# Patient Record
Sex: MALE | Race: WHITE | Employment: OTHER | URBAN - NONMETROPOLITAN AREA
[De-identification: names, ages, dates, MRNs, and addresses within clinical notes are randomized per-mention and may not be internally consistent; named-entity substitution may affect disease eponyms.]

---

## 2017-03-15 ENCOUNTER — OFFICE VISIT (OUTPATIENT)
Dept: ORTHOPEDIC SURGERY | Age: 62
End: 2017-03-15
Payer: COMMERCIAL

## 2017-03-15 VITALS
DIASTOLIC BLOOD PRESSURE: 64 MMHG | HEIGHT: 73 IN | BODY MASS INDEX: 22.7 KG/M2 | WEIGHT: 171.3 LBS | SYSTOLIC BLOOD PRESSURE: 120 MMHG | HEART RATE: 78 BPM

## 2017-03-15 DIAGNOSIS — M25.561 ACUTE PAIN OF BOTH KNEES: Primary | ICD-10-CM

## 2017-03-15 DIAGNOSIS — M25.562 ACUTE PAIN OF BOTH KNEES: Primary | ICD-10-CM

## 2017-03-15 PROCEDURE — 99203 OFFICE O/P NEW LOW 30 MIN: CPT | Performed by: FAMILY MEDICINE

## 2017-06-10 ENCOUNTER — OFFICE VISIT (OUTPATIENT)
Dept: PRIMARY CARE CLINIC | Age: 62
End: 2017-06-10
Payer: COMMERCIAL

## 2017-06-10 VITALS
OXYGEN SATURATION: 96 % | SYSTOLIC BLOOD PRESSURE: 100 MMHG | HEIGHT: 73 IN | TEMPERATURE: 97.2 F | DIASTOLIC BLOOD PRESSURE: 60 MMHG | BODY MASS INDEX: 22.93 KG/M2 | RESPIRATION RATE: 16 BRPM | WEIGHT: 173 LBS | HEART RATE: 60 BPM

## 2017-06-10 DIAGNOSIS — K04.7 CHRONIC DENTAL INFECTION: Primary | ICD-10-CM

## 2017-06-10 PROCEDURE — 99213 OFFICE O/P EST LOW 20 MIN: CPT | Performed by: FAMILY MEDICINE

## 2017-06-10 RX ORDER — CLINDAMYCIN HYDROCHLORIDE 300 MG/1
300 CAPSULE ORAL 3 TIMES DAILY
Qty: 30 CAPSULE | Refills: 0 | Status: SHIPPED | OUTPATIENT
Start: 2017-06-10 | End: 2017-06-20

## 2017-06-10 RX ORDER — MELOXICAM 15 MG/1
15 TABLET ORAL DAILY
COMMUNITY
Start: 2017-06-05 | End: 2017-11-01

## 2017-06-10 ASSESSMENT — ENCOUNTER SYMPTOMS
RESPIRATORY NEGATIVE: 1
GASTROINTESTINAL NEGATIVE: 1
ALLERGIC/IMMUNOLOGIC NEGATIVE: 1
EYES NEGATIVE: 1

## 2017-08-23 ENCOUNTER — OFFICE VISIT (OUTPATIENT)
Dept: UROLOGY | Age: 62
End: 2017-08-23
Payer: COMMERCIAL

## 2017-08-23 VITALS
BODY MASS INDEX: 22.4 KG/M2 | WEIGHT: 169 LBS | HEART RATE: 72 BPM | SYSTOLIC BLOOD PRESSURE: 102 MMHG | HEIGHT: 73 IN | DIASTOLIC BLOOD PRESSURE: 64 MMHG

## 2017-08-23 DIAGNOSIS — R35.0 FREQUENCY OF MICTURITION: Primary | ICD-10-CM

## 2017-08-23 DIAGNOSIS — N41.9 PROSTATITIS, UNSPECIFIED PROSTATITIS TYPE: ICD-10-CM

## 2017-08-23 LAB
BILIRUBIN, POC: NORMAL
BLOOD URINE, POC: NORMAL
CLARITY, POC: NORMAL
COLOR, POC: YELLOW
GLUCOSE URINE, POC: NORMAL
KETONES, POC: NORMAL
LEUKOCYTE EST, POC: NORMAL
NITRITE, POC: NORMAL
PH, POC: 6.5
PROTEIN, POC: NORMAL
SPECIFIC GRAVITY, POC: 1.01
UROBILINOGEN, POC: 0.2

## 2017-08-23 PROCEDURE — 99213 OFFICE O/P EST LOW 20 MIN: CPT | Performed by: UROLOGY

## 2017-08-23 PROCEDURE — 81003 URINALYSIS AUTO W/O SCOPE: CPT | Performed by: UROLOGY

## 2017-08-23 RX ORDER — OMEPRAZOLE 20 MG/1
20 CAPSULE, DELAYED RELEASE ORAL DAILY
Refills: 0 | COMMUNITY
Start: 2017-08-08 | End: 2017-12-20

## 2017-08-23 RX ORDER — CIPROFLOXACIN 500 MG/1
500 TABLET, FILM COATED ORAL 2 TIMES DAILY
Qty: 20 TABLET | Refills: 0 | Status: SHIPPED | OUTPATIENT
Start: 2017-08-23 | End: 2017-09-02

## 2017-10-27 DIAGNOSIS — M25.512 LEFT SHOULDER PAIN, UNSPECIFIED CHRONICITY: Primary | ICD-10-CM

## 2017-10-30 ENCOUNTER — HOSPITAL ENCOUNTER (OUTPATIENT)
Dept: GENERAL RADIOLOGY | Age: 62
Discharge: HOME | End: 2017-10-30
Payer: COMMERCIAL

## 2017-10-30 DIAGNOSIS — M25.512 LEFT SHOULDER PAIN, UNSPECIFIED CHRONICITY: ICD-10-CM

## 2017-10-30 PROCEDURE — 73030 X-RAY EXAM OF SHOULDER: CPT

## 2017-11-01 ENCOUNTER — OFFICE VISIT (OUTPATIENT)
Dept: ORTHOPEDIC SURGERY | Age: 62
End: 2017-11-01
Payer: COMMERCIAL

## 2017-11-01 VITALS
BODY MASS INDEX: 23.3 KG/M2 | DIASTOLIC BLOOD PRESSURE: 64 MMHG | HEIGHT: 72 IN | HEART RATE: 65 BPM | WEIGHT: 172 LBS | SYSTOLIC BLOOD PRESSURE: 110 MMHG

## 2017-11-01 DIAGNOSIS — M75.102 ROTATOR CUFF SYNDROME, LEFT: Primary | ICD-10-CM

## 2017-11-01 PROCEDURE — 99213 OFFICE O/P EST LOW 20 MIN: CPT | Performed by: FAMILY MEDICINE

## 2017-11-01 RX ORDER — MELOXICAM 15 MG/1
15 TABLET ORAL DAILY
Qty: 30 TABLET | Refills: 1 | Status: SHIPPED | OUTPATIENT
Start: 2017-11-01 | End: 2018-02-07

## 2017-11-01 NOTE — LETTER
[facility] ORTHOPEDICS  [facility]  Phone: [phone]  Fax: [phone]    Milena Lynn DO        November 1, 2017     Patient: Nataliia Smith   YOB: 1955   Date of Visit: 11/1/2017       To Whom It May Concern: It is my medical opinion that Susana Gambino may return to work on 1/1/2017. If you have any questions or concerns, please don't hesitate to call.     Sincerely,        Milena Lynn DO

## 2017-11-01 NOTE — LETTER
[facility] ORTHOPEDICS  [facility]  Phone: [phone]  Fax: [phone]    Nasrin Lawrence DO        November 1, 2017     Patient: Lissy Hay   YOB: 1955   Date of Visit: 11/1/2017       To Whom It May Concern: It is my medical opinion that Temi Chester may return to work on 11/01/2017. If you have any questions or concerns, please don't hesitate to call.     Sincerely,        Nasrin Lawrence DO

## 2017-11-01 NOTE — PROGRESS NOTES
Sports Medicine Consultation    CHIEF COMPLAINT:  Shoulder Pain (left shoulder pain)        HPI:   The patient is a 63 y.o. male who is being seen as a  established patient being seen for regarding new problem left shoulder. The patient is a right hand dominant male who has had shoulder pain for 1m. As far as trauma to the shoulder, the patient indicates no trauma. The pain is not worse at night and when doing overhead activities. Weakness of the shoulder has  been noted. The pain restricts activities such as does keep him from doing things at work but hasnt seen a big difference. The pain does not seem to improve with time. The following medications have been tried: was going to therapy with benefit. Physical Therapy has been tried. Corticosteroid injection has not been done. Neck pain has not been present. he has a past medical history of Depression and Reflux esophagitis. he has a past surgical history that includes Cystocopy; lymph node dissection (Left); Colonoscopy; and sinus surgery (Bilateral, 1994). Past Medical History:   Diagnosis Date    Depression     Reflux esophagitis        Past Surgical History:   Procedure Laterality Date    COLONOSCOPY      CYSTOSCOPY      WITH BLADDER BIOPSY    LYMPH NODE DISSECTION Left     AXILLARY    SINUS SURGERY Bilateral 1994       family history includes Diabetes in his father. Social History     Social History    Marital status:      Spouse name: N/A    Number of children: N/A    Years of education: N/A     Occupational History    Not on file.      Social History Main Topics    Smoking status: Never Smoker    Smokeless tobacco: Never Used    Alcohol use No    Drug use: No    Sexual activity: Not on file     Other Topics Concern    Not on file     Social History Narrative    No narrative on file       Current Outpatient Prescriptions   Medication Sig Dispense Refill    omeprazole (PRILOSEC) 20 MG delayed release Belly press test negative. Pain over AC joint negative. Pain over traps/rhomboids negative. PSYCH:  Patient has good fund of knowledge and displays understanding of exam.    RADIOLOGY: No results found. 3 views of the Left shoulder were ordered,  independently visualized by me, and discussed with patient. Findings: left shoulder radiographs are unremarkable     IMPRESSION:     1. Rotator cuff syndrome, left        PLAN:   We discussed some of the etiologies and natural histories of     ICD-10-CM ICD-9-CM    1. Rotator cuff syndrome, left M75.102 726.10 Amb External Referral To Physical Therapy     We discussed the various treatment alternatives including anti-inflammatory medications, physical therapy, injections, further imaging studies and as a last resort surgery. I do think the patient has some very mild rotator cuff syndrome and snapping that he is getting is coming from the undersurface of the acromion rubbing on supraspinous tendon. We'll have him continue with formal physical therapy prescription for meloxicam was provided as far up with me is otherwise as needed    Return to clinic Return if symptoms worsen or fail to improve. .    Electronically signed by Marichuy Alvarado DO, FAOASM on 11/1/17 at 9:32 AM

## 2017-12-20 ENCOUNTER — OFFICE VISIT (OUTPATIENT)
Dept: ORTHOPEDIC SURGERY | Age: 62
End: 2017-12-20
Payer: COMMERCIAL

## 2017-12-20 VITALS
BODY MASS INDEX: 23.3 KG/M2 | DIASTOLIC BLOOD PRESSURE: 73 MMHG | HEIGHT: 72 IN | WEIGHT: 172 LBS | HEART RATE: 63 BPM | SYSTOLIC BLOOD PRESSURE: 126 MMHG

## 2017-12-20 DIAGNOSIS — M75.102 ROTATOR CUFF SYNDROME, LEFT: Primary | ICD-10-CM

## 2017-12-20 PROCEDURE — 99213 OFFICE O/P EST LOW 20 MIN: CPT | Performed by: FAMILY MEDICINE

## 2018-01-03 ENCOUNTER — OFFICE VISIT (OUTPATIENT)
Dept: ORTHOPEDIC SURGERY | Age: 63
End: 2018-01-03
Payer: COMMERCIAL

## 2018-01-03 VITALS
HEART RATE: 76 BPM | SYSTOLIC BLOOD PRESSURE: 124 MMHG | WEIGHT: 172 LBS | BODY MASS INDEX: 23.3 KG/M2 | HEIGHT: 72 IN | DIASTOLIC BLOOD PRESSURE: 76 MMHG

## 2018-01-03 DIAGNOSIS — M75.102 ROTATOR CUFF SYNDROME, LEFT: Primary | ICD-10-CM

## 2018-01-03 PROCEDURE — 20610 DRAIN/INJ JOINT/BURSA W/O US: CPT | Performed by: FAMILY MEDICINE

## 2018-01-03 PROCEDURE — 99213 OFFICE O/P EST LOW 20 MIN: CPT | Performed by: FAMILY MEDICINE

## 2018-01-03 RX ORDER — TRIAMCINOLONE ACETONIDE 40 MG/ML
40 INJECTION, SUSPENSION INTRA-ARTICULAR; INTRAMUSCULAR ONCE
Status: CANCELLED | OUTPATIENT
Start: 2018-01-03 | End: 2018-01-03

## 2018-01-03 RX ORDER — BUPIVACAINE HYDROCHLORIDE 5 MG/ML
4 INJECTION, SOLUTION PERINEURAL ONCE
Status: COMPLETED | OUTPATIENT
Start: 2018-01-03 | End: 2018-01-03

## 2018-01-03 RX ORDER — BETAMETHASONE SODIUM PHOSPHATE AND BETAMETHASONE ACETATE 3; 3 MG/ML; MG/ML
6 INJECTION, SUSPENSION INTRA-ARTICULAR; INTRALESIONAL; INTRAMUSCULAR; SOFT TISSUE ONCE
Status: COMPLETED | OUTPATIENT
Start: 2018-01-03 | End: 2018-01-03

## 2018-01-03 RX ADMIN — BETAMETHASONE SODIUM PHOSPHATE AND BETAMETHASONE ACETATE 6 MG: 3; 3 INJECTION, SUSPENSION INTRA-ARTICULAR; INTRALESIONAL; INTRAMUSCULAR; SOFT TISSUE at 14:15

## 2018-01-03 RX ADMIN — BUPIVACAINE HYDROCHLORIDE 20 MG: 5 INJECTION, SOLUTION PERINEURAL at 14:14

## 2018-01-03 NOTE — PROGRESS NOTES
over [facility] joint negative. Pain over traps/rhomboids negative. PSYCH:  Patient has good fund of knowledge and displays understanding of exam.    RADIOLOGY: No results found. 3 views of the Left shoulder were ordered,  independently visualized by me, and discussed with patient. Findings: Previous left shoulders are unremarkable         IMPRESSION:     1. Rotator cuff syndrome, left        PLAN:   We discussed some of the etiologies and natural histories of     ICD-10-CM ICD-9-CM    1. Rotator cuff syndrome, left M75.102 726.10      We discussed the various treatment alternatives including anti-inflammatory medications, physical therapy, injections, further imaging studies and as a last resort surgery. This point patient would like to try for a little bit extra pain relief in that shoulder and I do think a cortisone is relatively reasonable one was administered into his left shoulder in the manners typed and chart patient time procedure well. F/u prn    Return to clinic No Follow-up on file. .    Electronically signed by Dontrell Rivera DO, FAOASM on 1/3/18 at 1:36 PM    After the risks, benefits, alternatives, and procedure was discussed with the patient. A timeout was performed. The patients Left shoulder was prepped in a sterile manner with alcohol. The skin was cooled with cold spray and then cleaned with an alcohol prep. I injected 1 mL 6 mg betamethasone and 4 ml of 0.5% Marcaine in a posterior subacromial bursa approach. Patient tolerated the procedure well. Post injection instructions provided.

## 2018-01-04 DIAGNOSIS — M25.511 RIGHT SHOULDER PAIN, UNSPECIFIED CHRONICITY: Primary | ICD-10-CM

## 2018-01-09 ENCOUNTER — HOSPITAL ENCOUNTER (OUTPATIENT)
Dept: GENERAL RADIOLOGY | Age: 63
Discharge: HOME | End: 2018-01-09
Payer: COMMERCIAL

## 2018-01-09 DIAGNOSIS — M25.511 RIGHT SHOULDER PAIN, UNSPECIFIED CHRONICITY: ICD-10-CM

## 2018-01-09 PROCEDURE — 73030 X-RAY EXAM OF SHOULDER: CPT

## 2018-01-10 ENCOUNTER — OFFICE VISIT (OUTPATIENT)
Dept: ORTHOPEDIC SURGERY | Age: 63
End: 2018-01-10
Payer: COMMERCIAL

## 2018-01-10 VITALS
BODY MASS INDEX: 23.3 KG/M2 | DIASTOLIC BLOOD PRESSURE: 68 MMHG | WEIGHT: 172 LBS | HEART RATE: 79 BPM | SYSTOLIC BLOOD PRESSURE: 102 MMHG | HEIGHT: 72 IN

## 2018-01-10 DIAGNOSIS — M75.41 IMPINGEMENT SYNDROME OF RIGHT SHOULDER: Primary | ICD-10-CM

## 2018-01-10 PROCEDURE — 99213 OFFICE O/P EST LOW 20 MIN: CPT | Performed by: FAMILY MEDICINE

## 2018-01-10 RX ORDER — BUPROPION HYDROCHLORIDE 150 MG/1
TABLET ORAL
Refills: 0 | COMMUNITY
Start: 2018-01-05 | End: 2022-02-08 | Stop reason: ALTCHOICE

## 2018-01-10 NOTE — PROGRESS NOTES
meloxicam (MOBIC) 15 MG tablet Take 1 tablet by mouth daily 30 tablet 1     No current facility-administered medications for this visit. Allergies:  heis allergic to pcn [penicillins]; sulfa antibiotics; triamcinolone; povidone iodine; and triamcinolone hexacetonide. ROS:  CV:  Denies chest pain; palpitations; shortness of breath; swelling of feet, ankles; and loss of consciousness. CON: Denies fever and dizziness. ENT:  Denies hearing loss / ringing, ear infections hoarseness, and swallowing problems. RESP:  Denies chronic cough, spitting up blood, and asthma/wheezing. GI: Denies abdominal pain, change in bowel habits, nausea or vomiting, and blood in stools. :  Denies frequent urination, burning or painful urination, blood in the urine, and bladder incontinence. NEURO:  Denies headache, memory loss, sleep disturbance, and tremor or movement disorder. PHYSICAL EXAM:   /68   Pulse 79   Ht 6' (1.829 m)   Wt 172 lb (78 kg)   BMI 23.33 kg/m²   GENERAL: Lynnea Bernheim is a 63 y.o. male who is alert and oriented and sitting comfortably in our office. SKIN:  Intact without rashes, lesions or ulcerations. No obvious deformity or swelling. NEURO: Musculoskeletal and axillary nerves intact to sensory and motor testing. EYES:  Extraocular muscles intact. MOUTH: Oral mucosa moist.  No perioral lesions. PULM:  Respirations unlabored and regular. VASC:  Capillary refill less than 3 seconds. Cervical spine ROM WNL  Spurlings: negative,     MSK:  Forward elevation 170 degrees, external rotation in neutral 90 degrees, abduction 180 degrees, internal rotation to L1. Supraspinatus 5/5   External rotators 5/5  Internal 5/5  Full Can negative   Empty Can negative   Neer's test negative   Horne-Ezn test. negative. Pain with cross body adduction negative. Anterior Labral Stress test negative. Speed's test negative   Harding's test negative.    Pain over anterolateral acromion

## 2018-02-07 ENCOUNTER — OFFICE VISIT (OUTPATIENT)
Dept: ORTHOPEDIC SURGERY | Age: 63
End: 2018-02-07
Payer: COMMERCIAL

## 2018-02-07 VITALS
BODY MASS INDEX: 23.3 KG/M2 | SYSTOLIC BLOOD PRESSURE: 109 MMHG | DIASTOLIC BLOOD PRESSURE: 67 MMHG | HEIGHT: 72 IN | HEART RATE: 71 BPM | WEIGHT: 172 LBS

## 2018-02-07 DIAGNOSIS — M75.41 IMPINGEMENT SYNDROME OF RIGHT SHOULDER: Primary | ICD-10-CM

## 2018-02-07 PROCEDURE — 99213 OFFICE O/P EST LOW 20 MIN: CPT | Performed by: FAMILY MEDICINE

## 2018-02-07 NOTE — PROGRESS NOTES
Sports Medicine Consultation    CHIEF COMPLAINT:  Shoulder Pain (rech right shoulder pain)        HPI:   The patient is a 63 y.o. male who is being seen as a  established patient being seen for regarding recurrence of a previously resolved problem r shoulder pain. The patient is a right hand dominant male who has had shoulder pain for days. As far as trauma to the shoulder, the patient indicates doing planks had some pain in r shoulder. The pain is not worse at night and when doing overhead activities. Weakness of the shoulder has not  been noted. The pain restricts activities such as none. The pain does not seem to improve with time. The following medications have been tried: PT with benefit. Physical Therapy has been tried. Corticosteroid injection has not been done. Neck pain has not been present. he has a past medical history of Depression and Reflux esophagitis. he has a past surgical history that includes Cystocopy; lymph node dissection (Left); Colonoscopy; and sinus surgery (Bilateral, 1994). Past Medical History:   Diagnosis Date    Depression     Reflux esophagitis        Past Surgical History:   Procedure Laterality Date    COLONOSCOPY      CYSTOSCOPY      WITH BLADDER BIOPSY    LYMPH NODE DISSECTION Left     AXILLARY    SINUS SURGERY Bilateral 1994       family history includes Diabetes in his father. Social History     Social History    Marital status:      Spouse name: N/A    Number of children: N/A    Years of education: N/A     Occupational History    Not on file.      Social History Main Topics    Smoking status: Never Smoker    Smokeless tobacco: Never Used    Alcohol use No    Drug use: No    Sexual activity: Not on file     Other Topics Concern    Not on file     Social History Narrative    No narrative on file       Current Outpatient Prescriptions   Medication Sig Dispense Refill    buPROPion (WELLBUTRIN XL) 150 MG extended release tablet 0     No current facility-administered medications for this visit. Allergies:  heis allergic to pcn [penicillins]; sulfa antibiotics; triamcinolone; povidone iodine; and triamcinolone hexacetonide. ROS:  CV:  Denies chest pain; palpitations; shortness of breath; swelling of feet, ankles; and loss of consciousness. CON: Denies fever and dizziness. ENT:  Denies hearing loss / ringing, ear infections hoarseness, and swallowing problems. RESP:  Denies chronic cough, spitting up blood, and asthma/wheezing. GI: Denies abdominal pain, change in bowel habits, nausea or vomiting, and blood in stools. :  Denies frequent urination, burning or painful urination, blood in the urine, and bladder incontinence. NEURO:  Denies headache, memory loss, sleep disturbance, and tremor or movement disorder. PHYSICAL EXAM:   /67   Pulse 71   Ht 6' (1.829 m)   Wt 172 lb (78 kg)   BMI 23.33 kg/m²   GENERAL: Halle Bradley is a 63 y.o. male who is alert and oriented and sitting comfortably in our office. SKIN:  Intact without rashes, lesions or ulcerations. No obvious deformity or swelling. NEURO: Musculoskeletal and axillary nerves intact to sensory and motor testing. EYES:  Extraocular muscles intact. MOUTH: Oral mucosa moist.  No perioral lesions. PULM:  Respirations unlabored and regular. VASC:  Capillary refill less than 3 seconds. Cervical spine ROM WNL  Spurlings: negative,     MSK:  Forward elevation 180degrees, external rotation in neutral 90 degrees, abduction 180 degrees, internal rotation to T8. Supraspinatus 5/5   External rotators 5/5  Internal 5/5  Full Can negative   Empty Can negative   Neer's test negative   Horne-Zen test. negative. Pain with cross body adduction negative. Anterior Labral Stress test negative. Speed's test negative   [place]'s test negative. Pain over anterolateral acromion negative. Subscap liftoff negative. Belly press test negative.

## 2018-03-17 ENCOUNTER — OFFICE VISIT (OUTPATIENT)
Dept: PRIMARY CARE CLINIC | Age: 63
End: 2018-03-17

## 2018-03-17 VITALS
DIASTOLIC BLOOD PRESSURE: 70 MMHG | TEMPERATURE: 97.5 F | OXYGEN SATURATION: 98 % | SYSTOLIC BLOOD PRESSURE: 104 MMHG | RESPIRATION RATE: 16 BRPM | BODY MASS INDEX: 23.49 KG/M2 | HEART RATE: 88 BPM | WEIGHT: 177.2 LBS | HEIGHT: 73 IN

## 2018-03-17 DIAGNOSIS — R10.31 RIGHT INGUINAL PAIN: Primary | ICD-10-CM

## 2018-03-17 PROCEDURE — 99213 OFFICE O/P EST LOW 20 MIN: CPT | Performed by: PHYSICIAN ASSISTANT

## 2018-03-17 ASSESSMENT — ENCOUNTER SYMPTOMS
VOMITING: 0
NAUSEA: 0
DIARRHEA: 0

## 2018-03-17 NOTE — PROGRESS NOTES
Subjective:      Patient ID: Leoncio Rodriguez is a 63 y.o. male. Patient is seen due to pain in right groin for several years. Yesterday he was lifting containers and is now worried thought to come in and get checked out. Occasional pain it is rare. No problems with BM. No bulges noted at times has felt wiggling and will push on it and no problems. Review of Systems   Constitutional: Negative for appetite change and fatigue. HENT: Negative. Respiratory: Negative. Cardiovascular: Negative. Gastrointestinal: Negative for blood in stool, constipation, diarrhea, nausea and vomiting. Genitourinary: Negative. Negative for difficulty urinating, hematuria, scrotal swelling and testicular pain. Musculoskeletal: Negative. Skin: Negative for color change and rash. Neurological: Negative for numbness. Psychiatric/Behavioral: Negative for sleep disturbance. Past Medical History:   Diagnosis Date    Depression     Reflux esophagitis      Past Surgical History:   Procedure Laterality Date    COLONOSCOPY      CYSTOSCOPY      WITH BLADDER BIOPSY    LYMPH NODE DISSECTION Left     AXILLARY    SINUS SURGERY Bilateral 1994     Social History     Social History    Marital status:      Spouse name: N/A    Number of children: N/A    Years of education: N/A     Occupational History    Not on file. Social History Main Topics    Smoking status: Never Smoker    Smokeless tobacco: Never Used    Alcohol use No    Drug use: No    Sexual activity: Not on file     Other Topics Concern    Not on file     Social History Narrative    No narrative on file     Family History   Problem Relation Age of Onset    Diabetes Father        Allergies   Allergen Reactions    Pcn [Penicillins]      Skin tested positive as child    Sulfa Antibiotics Nausea Only    Triamcinolone     Povidone Iodine Rash    Triamcinolone Hexacetonide Rash     Topical Kenalog only.  Patient reports having

## 2018-03-18 ASSESSMENT — ENCOUNTER SYMPTOMS
CONSTIPATION: 0
BLOOD IN STOOL: 0
COLOR CHANGE: 0
RESPIRATORY NEGATIVE: 1

## 2018-03-19 ENCOUNTER — TELEPHONE (OUTPATIENT)
Dept: PRIMARY CARE CLINIC | Age: 63
End: 2018-03-19

## 2018-03-19 NOTE — TELEPHONE ENCOUNTER
Spoke to patient regarding ultrasound of scrotum and testicles. Patient states he is in between insurance. Transferred patient to patient services to discuss billing. She gave him the hospital billing number to call and he will call back if he would like to schedule.

## 2018-03-22 DIAGNOSIS — N50.3 EPIDIDYMAL CYST: ICD-10-CM

## 2018-03-22 DIAGNOSIS — N43.3 HYDROCELE IN ADULT: Primary | ICD-10-CM

## 2018-04-18 ENCOUNTER — OFFICE VISIT (OUTPATIENT)
Dept: ORTHOPEDIC SURGERY | Age: 63
End: 2018-04-18
Payer: COMMERCIAL

## 2018-04-18 VITALS
WEIGHT: 177 LBS | BODY MASS INDEX: 23.46 KG/M2 | HEIGHT: 73 IN | HEART RATE: 82 BPM | DIASTOLIC BLOOD PRESSURE: 78 MMHG | SYSTOLIC BLOOD PRESSURE: 103 MMHG

## 2018-04-18 DIAGNOSIS — M75.102 BILATERAL ROTATOR CUFF SYNDROME: Primary | ICD-10-CM

## 2018-04-18 DIAGNOSIS — M75.101 BILATERAL ROTATOR CUFF SYNDROME: Primary | ICD-10-CM

## 2018-04-18 PROCEDURE — 99213 OFFICE O/P EST LOW 20 MIN: CPT | Performed by: FAMILY MEDICINE

## 2018-06-21 DIAGNOSIS — M25.561 CHRONIC PAIN OF RIGHT KNEE: Primary | ICD-10-CM

## 2018-06-21 DIAGNOSIS — G89.29 CHRONIC PAIN OF RIGHT KNEE: Primary | ICD-10-CM

## 2021-10-12 ENCOUNTER — OFFICE VISIT (OUTPATIENT)
Dept: ORTHOPEDIC SURGERY | Age: 66
End: 2021-10-12
Payer: COMMERCIAL

## 2021-10-12 VITALS
WEIGHT: 177 LBS | DIASTOLIC BLOOD PRESSURE: 70 MMHG | BODY MASS INDEX: 23.46 KG/M2 | HEIGHT: 73 IN | HEART RATE: 67 BPM | SYSTOLIC BLOOD PRESSURE: 126 MMHG

## 2021-10-12 DIAGNOSIS — M72.2 PLANTAR FASCIAL FIBROMATOSIS: Primary | ICD-10-CM

## 2021-10-12 PROCEDURE — 99213 OFFICE O/P EST LOW 20 MIN: CPT | Performed by: PODIATRIST

## 2021-10-18 NOTE — PROGRESS NOTES
[facility]         Consult and Procedure Note      [id number]  AGE: 61 y.o. GENDER: male  : 1955  EPISODE DATE:  10/12/2021    Subjective:     Chief Complaint   Patient presents with    Foot Problem     rech lópez feet/ orthotics         HISTORY of PRESENT ILLNESS HPI     Gale Henry is a 61 y.o. male presenting to clinic today for initial evaluation. Patient has previously been established with me through our [facility] office. 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 Patient was previously casted for custom orthotics which she was fit dispensed for in the office today. Patient was educated on the 2-3 week break in. Patient was encouraged to call the office should he have any questions. Patient to continue with at home doctor at the stretching and avoid barefoot ambulation. All further questions concerns regarding medical management were otherwise addressed. PAST MEDICAL HISTORY        Diagnosis Date    Depression     Reflux esophagitis        PAST SURGICAL HISTORY    Past Surgical History:   Procedure Laterality Date    COLONOSCOPY      CYSTOSCOPY      WITH BLADDER BIOPSY    LYMPH NODE DISSECTION Left     AXILLARY    SINUS SURGERY Bilateral        FAMILY HISTORY    Family History   Problem Relation Age of Onset    Diabetes Father        SOCIAL HISTORY    Social History     Tobacco Use    Smoking status: Never Smoker    Smokeless tobacco: Never Used   Substance Use Topics    Alcohol use: No    Drug use: No       ALLERGIES    Allergies   Allergen Reactions    Pcn [Penicillins]      Skin tested positive as child    Sulfa Antibiotics Nausea Only    Triamcinolone     Povidone Iodine Rash    Triamcinolone Hexacetonide Rash     Topical Kenalog only.  Patient reports having multiple celestone injections without rash MEDICATIONS    Current Outpatient Medications on File Prior to Visit   Medication Sig Dispense Refill    buPROPion (WELLBUTRIN XL) 150 MG extended release tablet   0     No current facility-administered medications on file prior to visit. Review of Systems  General: (-) weight change, fatigue, weakness, fever, chills, night sweats  Head: (-) trauma, heacache location, frequency, nausea, vomiting, visual changes  Eyes: (-) glasses, contact lenses, blurriness, tearing, itching, acute visual changes  Ears: (-) hearing loss, tinnitus, vertigo, discharge, earache  Skin: (-) rash, subcutaneous lesion, open ulceration      Objective:      /70   Pulse 67   Ht 6' 1\" (1.854 m)   Wt 177 lb (80.3 kg)   BMI 23.35 kg/m²     Wt Readings from Last 3 Encounters:   10/12/21 177 lb (80.3 kg)   04/18/18 177 lb (80.3 kg)   03/17/18 177 lb 3.2 oz (80.4 kg)       PHYSICAL EXAMINATION  CONSTITUTIONAL:  awake, alert, cooperative, no apparent distress, and appears stated age  Vascular: Pedal pulses are palpable skin temperature within normal means capillary fill time intact to lesser toes. Dermatologic: Skin envelope appears well maintained, hydrated  Neurovascular: Epicritic sensations grossly intact  Musculoskeletal: Patient is noted to have 5 of 5 muscle strength to all extrinsic muscle groups tested. Good range of motion within the ankle joint with no crepitus appreciated.   Semireducible cavovarus deformity hindfoot driven bilateral.    IMAGING:  Not applicable         LABS       CBC:   Lab Results   Component Value Date    WBC 9.7 07/05/2016    HGB 14.1 07/05/2016    HCT 41.8 07/05/2016    MCV 88.2 07/05/2016     07/05/2016     BMP:   Lab Results   Component Value Date     07/05/2016    K 3.5 07/05/2016    CL 99 07/05/2016    CO2 24 07/05/2016    BUN 19 07/05/2016    CREATININE 0.83 07/05/2016     PT/INR: No results found for: PROTIME, INR  Prealbumin: No results found for: PREALBUMIN  Albumin:No

## 2022-02-08 ENCOUNTER — OFFICE VISIT (OUTPATIENT)
Dept: ORTHOPEDIC SURGERY | Age: 67
End: 2022-02-08
Payer: COMMERCIAL

## 2022-02-08 VITALS
DIASTOLIC BLOOD PRESSURE: 68 MMHG | BODY MASS INDEX: 23.46 KG/M2 | HEART RATE: 82 BPM | WEIGHT: 177 LBS | SYSTOLIC BLOOD PRESSURE: 130 MMHG | HEIGHT: 73 IN | OXYGEN SATURATION: 98 %

## 2022-02-08 DIAGNOSIS — M72.2 PLANTAR FASCIAL FIBROMATOSIS: Primary | ICD-10-CM

## 2022-02-08 PROCEDURE — 99213 OFFICE O/P EST LOW 20 MIN: CPT | Performed by: PODIATRIST

## 2022-02-08 RX ORDER — ASPIRIN 81 MG/1
81 TABLET, CHEWABLE ORAL EVERY OTHER DAY
COMMUNITY

## 2022-02-08 RX ORDER — MELOXICAM 15 MG/1
15 TABLET ORAL DAILY
Qty: 30 TABLET | Refills: 3 | Status: SHIPPED | OUTPATIENT
Start: 2022-02-08 | End: 2022-09-22

## 2022-02-08 RX ORDER — VIT C/HESPERIDIN/BIOFLAVONOIDS 500-100 MG
TABLET ORAL
COMMUNITY
End: 2022-08-18

## 2022-02-08 RX ORDER — ASCORBIC ACID 500 MG
500 TABLET ORAL DAILY
COMMUNITY

## 2022-02-20 NOTE — PROGRESS NOTES
Allergen Reactions    Pcn [Penicillins]      Skin tested positive as child    Sulfa Antibiotics Nausea Only    Triamcinolone     Povidone Iodine Rash    Triamcinolone Hexacetonide Rash     Topical Kenalog only. Patient reports having multiple celestone injections without rash       MEDICATIONS    Current Outpatient Medications on File Prior to Visit   Medication Sig Dispense Refill    aspirin 81 MG chewable tablet Take 81 mg by mouth every other day      ascorbic acid (VITAMIN C) 500 MG tablet Take 500 mg by mouth daily      vitamin D (CHOLECALCIFEROL) 125 MCG (5000 UT) CAPS capsule Take 5,000 Units by mouth daily      Zinc 30 MG TABS Take by mouth       No current facility-administered medications on file prior to visit. Review of Systems  General: (-) weight change, fatigue, weakness, fever, chills, night sweats  Head: (-) trauma, heacache location, frequency, nausea, vomiting, visual changes  Eyes: (-) glasses, contact lenses, blurriness, tearing, itching, acute visual changes  Ears: (-) hearing loss, tinnitus, vertigo, discharge, earache  Skin: (-) rash, subcutaneous lesion, open ulceration      Objective:      /68   Pulse 82   Ht 6' 1\" (1.854 m)   Wt 177 lb (80.3 kg)   SpO2 98%   BMI 23.35 kg/m²     Wt Readings from Last 3 Encounters:   02/08/22 177 lb (80.3 kg)   10/12/21 177 lb (80.3 kg)   04/18/18 177 lb (80.3 kg)       PHYSICAL EXAMINATION  CONSTITUTIONAL:  awake, alert, cooperative, no apparent distress, and appears stated age  Vascular: Pedal pulses are palpable skin temperature within normal means capillary fill time intact to lesser toes. Dermatologic: Skin envelope appears well maintained, hydrated  Neurovascular: Epicritic sensations grossly intact  Musculoskeletal: Patient is noted to have 5 of 5 muscle strength to all extrinsic muscle groups tested. Good range of motion within the ankle joint with no crepitus appreciated.   Semireducible cavovarus deformity hindfoot driven bilateral.  Previous pain over distribution of the fifth metatarsal styloid process bilateral resolved. New onset pain along the dorsal medial aspect of his left first metatarsophalangeal joint with clinical findings of hallux limitus. Overall alignment appears maintained    IMAGING:  N/a    LABS       CBC:   Lab Results   Component Value Date    WBC 9.7 07/05/2016    HGB 14.1 07/05/2016    HCT 41.8 07/05/2016    MCV 88.2 07/05/2016     07/05/2016     BMP:   Lab Results   Component Value Date     07/05/2016    K 3.5 07/05/2016    CL 99 07/05/2016    CO2 24 07/05/2016    BUN 19 07/05/2016    CREATININE 0.83 07/05/2016     PT/INR: No results found for: PROTIME, INR  Prealbumin: No results found for: PREALBUMIN  Albumin:No results found for: LABALBU  Sed Rate:No results found for: SEDRATE  CRP: No results found for: CRP  Micro: No results found for: BC   Hemoglobin A1C: No results found for: LABA1C    Assessment:      Diagnosis Orders   1. Plantar fascial fibromatosis  meloxicam (MOBIC) 15 MG tablet        Patient Active Problem List   Diagnosis    Plantar fascial fibromatosis R/L    Acquired deformity of toe   2.hallux limitus, left      Procedure Note  N/a    Plan:     Patient examined and evaluated  Patient provided updated prescription for Mobic 15 mg once daily  Patient will continue with at home doctor directed stretching  Patient encouraged to continue with good supportive shoe gear avoid barefoot ambulation  Follow-up in 6 to 8 weeks  All further questions concerns regarding medical management were otherwise addressed      Doreen Banegas   Electronically signed by Savannah Hernandez DPM on 2/20/2022 at 4:33 PM      No orders of the defined types were placed in this encounter.

## 2022-04-07 ENCOUNTER — OFFICE VISIT (OUTPATIENT)
Dept: ORTHOPEDIC SURGERY | Age: 67
End: 2022-04-07
Payer: COMMERCIAL

## 2022-04-07 VITALS
SYSTOLIC BLOOD PRESSURE: 109 MMHG | HEART RATE: 76 BPM | HEIGHT: 73 IN | WEIGHT: 177 LBS | DIASTOLIC BLOOD PRESSURE: 75 MMHG | BODY MASS INDEX: 23.46 KG/M2

## 2022-04-07 DIAGNOSIS — M72.2 PLANTAR FASCIAL FIBROMATOSIS: Primary | ICD-10-CM

## 2022-04-07 DIAGNOSIS — M20.5X2 HALLUX LIMITUS, ACQUIRED, LEFT: ICD-10-CM

## 2022-04-07 PROCEDURE — 99213 OFFICE O/P EST LOW 20 MIN: CPT | Performed by: PODIATRIST

## 2022-04-07 NOTE — PROGRESS NOTES
[address]         Progress and Procedure Note      Vishnu Cazares  MEDICAL RECORD NUMBER:  [id number]  AGE: 61 y.o. GENDER: male  : 1955  EPISODE DATE:  2022    Subjective:     Chief Complaint   Patient presents with    Pain     RE CK FITZ, FEET PAIN         HISTORY of PRESENT ILLNESS HPI  22   Patient is a pleasant 77-year-old male following up for bilateral foot ankle pain. Patient suffers from primary osteoarthritis of the midfoot coupled with a hypertrophic styloid process to bilateral forefoot. Patient also has some early onset hallux limitus to his left first metatarsophalangeal joint. Patient is comfortably ambulating in his custom molded orthotics. Patient did vocalize desire to come off of his Mobic. Christine taper him off he is getting continue with Mobic daily over the next week followed by  from the 14th of the 21st followed by Tuesday and Thursday the following week at which point he will have completed his taper. Patient will continue with daily stretching good supportive shoe gear follow-up with us as needed. All questions concerns by medical management otherwise addressed. 22  Patient is a pleasant 77-year-old male who is following up for bilateral foot ankle pain. Patient was previously casted for custom molded orthotics with an accommodative doughnut underlying the fifth metatarsal styloid process bilateral.  Patient overall is done well with his orthotics presents today with interval sprain of his left great toe. States that he was ambulating barefoot when he initially hurt the foot. He was placed on prednisone initially but has since completed this. 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 Discussed conservative or surgical options in the office today.   Patient is encouraged to continue with conservative care including good supportive shoes, custom molded orthotics, at home stretching. Patient will be started on Mobic 15 mg daily. Patient will follow-up with us in 6 to 8 weeks. All further questions concerns were otherwise addressed. PAST MEDICAL HISTORY        Diagnosis Date    Depression     Reflux esophagitis        PAST SURGICAL HISTORY    Past Surgical History:   Procedure Laterality Date    COLONOSCOPY      CYSTOSCOPY      WITH BLADDER BIOPSY    LYMPH NODE DISSECTION Left     AXILLARY    SINUS SURGERY Bilateral 1994       FAMILY HISTORY    Family History   Problem Relation Age of Onset    Diabetes Father        SOCIAL HISTORY    Social History     Tobacco Use    Smoking status: Never Smoker    Smokeless tobacco: Never Used   Vaping Use    Vaping Use: Never used   Substance Use Topics    Alcohol use: No    Drug use: No       ALLERGIES    Allergies   Allergen Reactions    Pcn [Penicillins]      Skin tested positive as child    Sulfa Antibiotics Nausea Only    Triamcinolone     Povidone Iodine Rash    Triamcinolone Hexacetonide Rash     Topical Kenalog only. Patient reports having multiple celestone injections without rash       MEDICATIONS    Current Outpatient Medications on File Prior to Visit   Medication Sig Dispense Refill    aspirin 81 MG chewable tablet Take 81 mg by mouth every other day      ascorbic acid (VITAMIN C) 500 MG tablet Take 500 mg by mouth daily      vitamin D (CHOLECALCIFEROL) 125 MCG (5000 UT) CAPS capsule Take 5,000 Units by mouth daily      Zinc 30 MG TABS Take by mouth      meloxicam (MOBIC) 15 MG tablet Take 1 tablet by mouth daily 30 tablet 3     No current facility-administered medications on file prior to visit.        Review of Systems  General: (-) weight change, fatigue, weakness, fever, chills, night sweats  Head: (-) trauma, heacache location, frequency, nausea, vomiting, visual changes  Eyes: (-) glasses, contact lenses, blurriness, tearing, itching, acute visual changes  Ears: (-) hearing loss, tinnitus, vertigo, discharge, earache  Skin: (-) rash, subcutaneous lesion, open ulceration      Objective:      /75   Pulse 76   Ht 6' 1\" (1.854 m)   Wt 177 lb (80.3 kg)   BMI 23.35 kg/m²     Wt Readings from Last 3 Encounters:   04/07/22 177 lb (80.3 kg)   02/08/22 177 lb (80.3 kg)   10/12/21 177 lb (80.3 kg)       PHYSICAL EXAMINATION  CONSTITUTIONAL:  awake, alert, cooperative, no apparent distress, and appears stated age  Vascular: Pedal pulses are palpable skin temperature within normal means capillary fill time intact to lesser toes. Dermatologic: Skin envelope appears well maintained, hydrated  Neurovascular: Epicritic sensations grossly intact  Musculoskeletal: Patient is noted to have 5 of 5 muscle strength to all extrinsic muscle groups tested. Good range of motion within the ankle joint with no crepitus appreciated. Semireducible cavovarus deformity hindfoot driven bilateral.  Minimal pain with palpation manipulation of the left first metatarsophalangeal joint which continues to have heterotrophic bone growth along the dorsal medial aspect. IMAGING:  N/a    LABS       CBC:   Lab Results   Component Value Date    WBC 9.7 07/05/2016    HGB 14.1 07/05/2016    HCT 41.8 07/05/2016    MCV 88.2 07/05/2016     07/05/2016     BMP:   Lab Results   Component Value Date     07/05/2016    K 3.5 07/05/2016    CL 99 07/05/2016    CO2 24 07/05/2016    BUN 19 07/05/2016    CREATININE 0.83 07/05/2016     PT/INR: No results found for: PROTIME, INR  Prealbumin: No results found for: PREALBUMIN  Albumin:No results found for: LABALBU  Sed Rate:No results found for: SEDRATE  CRP: No results found for: CRP  Micro: No results found for: BC   Hemoglobin A1C: No results found for: LABA1C    Assessment:      Diagnosis Orders   1. Plantar fascial fibromatosis     2.  Hallux limitus, acquired, left          Patient Active Problem List   Diagnosis    Plantar fascial fibromatosis R/L    Acquired deformity of toe   2.hallux limitus, left      Procedure Note  N/a    Plan:     Patient examined and evaluated  Patient will continue with daily stretching good supportive shoe gear  Continue with custom molded orthotics  Patient given instructions how to taper off of his Mobic  Follow-up as needed      Akil Washington, [facility]   Electronically signed by Akil Washington DPM on 4/7/2022 at 3:13 PM      No orders of the defined types were placed in this encounter.

## 2022-08-18 ENCOUNTER — OFFICE VISIT (OUTPATIENT)
Dept: ORTHOPEDIC SURGERY | Age: 67
End: 2022-08-18
Payer: COMMERCIAL

## 2022-08-18 VITALS
WEIGHT: 177 LBS | BODY MASS INDEX: 23.46 KG/M2 | SYSTOLIC BLOOD PRESSURE: 100 MMHG | HEIGHT: 73 IN | DIASTOLIC BLOOD PRESSURE: 68 MMHG | HEART RATE: 78 BPM

## 2022-08-18 DIAGNOSIS — M20.5X2 HALLUX LIMITUS, ACQUIRED, LEFT: ICD-10-CM

## 2022-08-18 DIAGNOSIS — M72.2 PLANTAR FASCIAL FIBROMATOSIS: Primary | ICD-10-CM

## 2022-08-18 PROCEDURE — 99213 OFFICE O/P EST LOW 20 MIN: CPT | Performed by: PODIATRIST

## 2022-08-18 RX ORDER — MELOXICAM 15 MG/1
15 TABLET ORAL DAILY
Qty: 30 TABLET | Refills: 3 | Status: SHIPPED | OUTPATIENT
Start: 2022-08-18 | End: 2022-09-22

## 2022-08-18 NOTE — PROGRESS NOTES
[address]         Progress and Procedure Note      Sukhjinder Bazan  MEDICAL RECORD NUMBER:  [id number]  AGE: 61 y.o. GENDER: male  : 1955  EPISODE DATE:  2022    Subjective:     Chief Complaint   Patient presents with    Foot Pain     Rech lópez foot pain           HISTORY of PRESENT ILLNESS HPI    22   Patient is a pleasant 63-year-old male following up for bilateral foot ankle pain. Patient elicits that he is attempted to increase his mileage with walking on a daily basis and saw direct correlation with increased foot pain. Patient has a prior history of more or less atrophic fat pad to bilateral forefoot coupled with hypertrophic styloid process to the fifth metatarsal base bilateral as well as primary osteoarthritis. Patient suffers from hallux limitus of the left first metatarsophalangeal joint as well as some new onset plantar fasciitis to the plantar aspect of his right foot. Patient has previously been on Mobic tolerated it well but had desire to come off of it has been back on Mobic over the last week states that he notices increased improvement. We will give him a refill on his Mobic as well as really enforce the fact that he needs to focus on good supportive shoe gear with his custom molded orthotics. Patient was fit dispensed his custom molded orthotics back on 2021 and is not approved for new orthotics at this point time. Patient was given updated stretching handout we will follow-up with us in 3 months.   All questions concerns regarding medical management were otherwise addressed        PAST MEDICAL HISTORY        Diagnosis Date    Depression     Reflux esophagitis        PAST SURGICAL HISTORY    Past Surgical History:   Procedure Laterality Date    COLONOSCOPY      CYSTOSCOPY      WITH BLADDER BIOPSY    LYMPH NODE DISSECTION Left     AXILLARY    SINUS SURGERY Bilateral        FAMILY HISTORY    Family History   Problem Relation Age of Onset toes.  Dermatologic: Skin envelope appears well maintained, hydrated  Neurovascular: Epicritic sensations grossly intact  Musculoskeletal: Patient is noted to have 5 of 5 muscle strength to all extrinsic muscle groups tested. Patient noted to have a tight gastroc soleal complex with knee both extended and flexed. Increased pain over distribution of his medial plantar fascial band right forefoot indicative of underlying plantar fasciitis which is exacerbated with dorsiflexion of his lesser metatarsophalangeal joints. Patient is also noted to have findings of some mild joint crepitus with range of motion of his left first metatarsal phalange joint indicative of hallux limitus. IMAGING:  N/a    LABS       CBC:   Lab Results   Component Value Date/Time    WBC 9.7 07/05/2016 04:53 PM    HGB 14.1 07/05/2016 04:53 PM    HCT 41.8 07/05/2016 04:53 PM    MCV 88.2 07/05/2016 04:53 PM     07/05/2016 04:53 PM     BMP:   Lab Results   Component Value Date/Time     07/05/2016 04:53 PM    K 3.5 07/05/2016 04:53 PM    CL 99 07/05/2016 04:53 PM    CO2 24 07/05/2016 04:53 PM    BUN 19 07/05/2016 04:53 PM    CREATININE 0.83 07/05/2016 04:53 PM     PT/INR: No results found for: PROTIME, INR  Prealbumin: No results found for: PREALBUMIN  Albumin:No results found for: LABALBU  Sed Rate:No results found for: SEDRATE  CRP: No results found for: CRP  Micro: No results found for: BC   Hemoglobin A1C: No results found for: LABA1C    Assessment:      Diagnosis Orders   1. Plantar fascial fibromatosis  meloxicam (MOBIC) 15 MG tablet      2. Hallux limitus, acquired, left  meloxicam (MOBIC) 15 MG tablet           Patient Active Problem List   Diagnosis    Plantar fascial fibromatosis R/L    Acquired deformity of toe   2.hallux limitus, left      Procedure Note  N/a    Plan:     Patient examined and evaluated  Patient was given updated prescription for Mobic, take as directed  Continue with at home. directed stretching  Continue with good supportive tennis shoes with custom molded orthotics  Follow-up in 3 months      Doreen Metzger   Electronically signed by Silas Virk DPM on 8/18/2022 at 11:23 AM      No orders of the defined types were placed in this encounter.

## 2022-09-22 ENCOUNTER — OFFICE VISIT (OUTPATIENT)
Dept: ORTHOPEDIC SURGERY | Age: 67
End: 2022-09-22
Payer: MEDICARE

## 2022-09-22 VITALS
BODY MASS INDEX: 23.46 KG/M2 | SYSTOLIC BLOOD PRESSURE: 126 MMHG | HEART RATE: 76 BPM | WEIGHT: 177 LBS | DIASTOLIC BLOOD PRESSURE: 67 MMHG | HEIGHT: 73 IN

## 2022-09-22 DIAGNOSIS — M72.2 PLANTAR FASCIAL FIBROMATOSIS: Primary | ICD-10-CM

## 2022-09-22 DIAGNOSIS — M20.5X2 HALLUX LIMITUS, ACQUIRED, LEFT: ICD-10-CM

## 2022-09-22 PROCEDURE — G8427 DOCREV CUR MEDS BY ELIG CLIN: HCPCS | Performed by: NURSE PRACTITIONER

## 2022-09-22 PROCEDURE — 99213 OFFICE O/P EST LOW 20 MIN: CPT | Performed by: NURSE PRACTITIONER

## 2022-09-22 PROCEDURE — 99212 OFFICE O/P EST SF 10 MIN: CPT | Performed by: NURSE PRACTITIONER

## 2022-09-22 PROCEDURE — 3017F COLORECTAL CA SCREEN DOC REV: CPT | Performed by: NURSE PRACTITIONER

## 2022-09-22 PROCEDURE — 1123F ACP DISCUSS/DSCN MKR DOCD: CPT | Performed by: NURSE PRACTITIONER

## 2022-09-22 PROCEDURE — G8420 CALC BMI NORM PARAMETERS: HCPCS | Performed by: NURSE PRACTITIONER

## 2022-09-22 PROCEDURE — 1036F TOBACCO NON-USER: CPT | Performed by: NURSE PRACTITIONER

## 2022-09-22 NOTE — PROGRESS NOTES
[address]         Progress and Procedure Note      Risa Conte  MEDICAL RECORD NUMBER:  [id number]  AGE: 74 y.o. GENDER: male  : 1955  EPISODE DATE:  2022    Subjective:     Chief Complaint   Patient presents with    Foot Pain     Right foot pain         HISTORY of PRESENT ILLNESS HPI    22   Patient is a pleasant 44-year-old gentleman following for bilateral foot and ankle pain. Patient has a prior history of atrophic fat pad to bilateral forefeet coupled with hypertrophic styloid process to the fifth metatarsal base bilaterally as well as primary osteoarthritis. Patient also suffers from hallux limitus of the left first dorsal phalangeal joint as well as ongoing plantar fasciitis of the right foot. Upon assessment today, patient's biggest complaint is still having some ongoing plantar fasciitis of the right foot. Patient continues to ambulate good supportive tennis shoes as well as his custom orthotics. Patient states he is been able to walk approxi-1/2 miles daily. I did encourage patient that he would more likely benefit from crosstraining seeing that he continues some ongoing plantar fasciitis with his activities. Patient states he is still stretching but really only 1 time a day when he remembers. Patient was encouraged to try to increase his stretching the 2-3 if not 3-4 times daily. No medications needed. Patient states he has weaned himself off of his Mobic. Patient will follow back up with us again at the 3-month gilberto for reevaluation.       PAST MEDICAL HISTORY        Diagnosis Date    Depression     Reflux esophagitis        PAST SURGICAL HISTORY    Past Surgical History:   Procedure Laterality Date    COLONOSCOPY      CYSTOSCOPY      WITH BLADDER BIOPSY    LYMPH NODE DISSECTION Left     AXILLARY    SINUS SURGERY Bilateral        FAMILY HISTORY    Family History   Problem Relation Age of Onset    Diabetes Father        SOCIAL HISTORY    Social History     Tobacco Use    Smoking status: Never    Smokeless tobacco: Never   Vaping Use    Vaping Use: Never used   Substance Use Topics    Alcohol use: No    Drug use: No       ALLERGIES    Allergies   Allergen Reactions    Pcn [Penicillins]      Skin tested positive as child    Sulfa Antibiotics Nausea Only    Triamcinolone     Povidone Iodine Rash    Triamcinolone Hexacetonide Rash     Topical Kenalog only. Patient reports having multiple celestone injections without rash       MEDICATIONS    Current Outpatient Medications on File Prior to Visit   Medication Sig Dispense Refill    Multiple Vitamin (MULTI-VITAMIN DAILY PO) Take by mouth      aspirin 81 MG chewable tablet Take 81 mg by mouth every other day      ascorbic acid (VITAMIN C) 500 MG tablet Take 500 mg by mouth daily      vitamin D (CHOLECALCIFEROL) 125 MCG (5000 UT) CAPS capsule Take 5,000 Units by mouth daily       No current facility-administered medications on file prior to visit. Review of Systems  General: (-) weight change, fatigue, weakness, fever, chills, night sweats  Head: (-) trauma, heacache location, frequency, nausea, vomiting, visual changes  Eyes: (-) glasses, contact lenses, blurriness, tearing, itching, acute visual changes  Ears: (-) hearing loss, tinnitus, vertigo, discharge, earache  Skin: (-) rash, subcutaneous lesion, open ulceration      Objective:      /67   Pulse 76   Ht 6' 1\" (1.854 m)   Wt 177 lb (80.3 kg)   BMI 23.35 kg/m²     Wt Readings from Last 3 Encounters:   09/22/22 177 lb (80.3 kg)   08/18/22 177 lb (80.3 kg)   04/07/22 177 lb (80.3 kg)       PHYSICAL EXAMINATION  CONSTITUTIONAL:  awake, alert, cooperative, no apparent distress, and appears stated age  Vascular: Pedal pulses are palpable skin temperature within normal means capillary fill time intact to lesser toes.   Dermatologic: Skin envelope appears well maintained, hydrated  Neurovascular: Epicritic sensations grossly intact  Musculoskeletal: Patient is noted to have 5 of 5 muscle strength to all extrinsic muscle groups tested. Patient noted to have a tight gastroc soleal complex with knee both extended and flexed. Increased pain over distribution of his medial plantar fascial band right forefoot indicative of underlying plantar fasciitis which is exacerbated with dorsiflexion of his lesser metatarsophalangeal joints. Patient is also noted to have findings of some mild joint crepitus with range of motion of his left first metatarsal phalange joint indicative of hallux limitus. IMAGING:  N/a    LABS       CBC:   Lab Results   Component Value Date/Time    WBC 9.7 07/05/2016 04:53 PM    HGB 14.1 07/05/2016 04:53 PM    HCT 41.8 07/05/2016 04:53 PM    MCV 88.2 07/05/2016 04:53 PM     07/05/2016 04:53 PM     BMP:   Lab Results   Component Value Date/Time     07/05/2016 04:53 PM    K 3.5 07/05/2016 04:53 PM    CL 99 07/05/2016 04:53 PM    CO2 24 07/05/2016 04:53 PM    BUN 19 07/05/2016 04:53 PM    CREATININE 0.83 07/05/2016 04:53 PM     PT/INR: No results found for: PROTIME, INR  Prealbumin: No results found for: PREALBUMIN  Albumin:No results found for: LABALBU  Sed Rate:No results found for: SEDRATE  CRP: No results found for: CRP  Micro: No results found for: BC   Hemoglobin A1C: No results found for: LABA1C    Assessment:      Diagnosis Orders   1. Plantar fascial fibromatosis             Patient Active Problem List   Diagnosis    Plantar fascial fibromatosis R/L    Acquired deformity of toe   2.hallux limitus, left      Procedure Note  N/a    Plan:     Patient examined and evaluated today. Still having some ongoing plantar fasciitis of the right foot. I did encourage patient to stay in good supportive tennis shoes as well as custom orthotics. Patient was also instructed to get back into a better stretching routine 2-3 if not 3-4 times daily. No medications were needed today. Patient is weaned himself off of his Mobic.   Patient will follow back up with us again at the 3-month gilberto for reevaluation. Nella La, [facility]   Electronically signed by PING Parker CNP on 9/22/2022 at 4:14 PM      No orders of the defined types were placed in this encounter.

## 2023-02-02 ENCOUNTER — OFFICE VISIT (OUTPATIENT)
Dept: ORTHOPEDIC SURGERY | Age: 68
End: 2023-02-02
Payer: MEDICARE

## 2023-02-02 VITALS
RESPIRATION RATE: 16 BRPM | DIASTOLIC BLOOD PRESSURE: 68 MMHG | OXYGEN SATURATION: 99 % | HEART RATE: 82 BPM | SYSTOLIC BLOOD PRESSURE: 120 MMHG | BODY MASS INDEX: 23.46 KG/M2 | WEIGHT: 177 LBS | HEIGHT: 73 IN

## 2023-02-02 DIAGNOSIS — M72.2 PLANTAR FASCIAL FIBROMATOSIS: Primary | ICD-10-CM

## 2023-02-02 PROCEDURE — G8484 FLU IMMUNIZE NO ADMIN: HCPCS | Performed by: PODIATRIST

## 2023-02-02 PROCEDURE — 3017F COLORECTAL CA SCREEN DOC REV: CPT | Performed by: PODIATRIST

## 2023-02-02 PROCEDURE — 1123F ACP DISCUSS/DSCN MKR DOCD: CPT | Performed by: PODIATRIST

## 2023-02-02 PROCEDURE — G8420 CALC BMI NORM PARAMETERS: HCPCS | Performed by: PODIATRIST

## 2023-02-02 PROCEDURE — 99213 OFFICE O/P EST LOW 20 MIN: CPT | Performed by: PODIATRIST

## 2023-02-02 PROCEDURE — 99212 OFFICE O/P EST SF 10 MIN: CPT | Performed by: PODIATRIST

## 2023-02-02 PROCEDURE — G8427 DOCREV CUR MEDS BY ELIG CLIN: HCPCS | Performed by: PODIATRIST

## 2023-02-02 PROCEDURE — 1036F TOBACCO NON-USER: CPT | Performed by: PODIATRIST

## 2023-02-02 RX ORDER — CHLORAL HYDRATE 500 MG
CAPSULE ORAL
COMMUNITY

## 2023-02-02 NOTE — PROGRESS NOTES
[address]         Progress and Procedure Note      Dayana Wilde  MEDICAL RECORD NUMBER:  [id number]  AGE: 74 y.o. GENDER: male  : 1955  EPISODE DATE:  2023    Subjective:     Chief Complaint   Patient presents with    Foot Pain     Check right heel          HISTORY of PRESENT ILLNESS HPI    23     Patient is a pleasant 79-year-old male following up for bilateral foot ankle pain. Patient suffers from significant fat pad atrophy with heterotrophic bone formation particular along the plantar lateral aspect of his calcaneus as well as styloid process, right worse than left. Patient has gone by well over the last several years with a functional-based custom orthotic with metatarsal pad he had dancers pad due to some underlying hallux limitus. Patient is more or less worn out his top-cover and was interested in obtaining new orthotics. Patient was given the recommendation of obtaining some over-the-counter silicone full-length pads in the interval to give him some better relief and was given updated prescription for new orthotics in the form of a functional based full-length orthotic with metatarsal offloading pad dancers pad as well as thick top-cover. Patient will follow-up with us as needed. Patient will continue with at home doctor directed stretching. Patient can continues to ambulate good supportive tennis shoes in the form of an oboz lace up.          PAST MEDICAL HISTORY        Diagnosis Date    Depression     Reflux esophagitis        PAST SURGICAL HISTORY    Past Surgical History:   Procedure Laterality Date    COLONOSCOPY      CYSTOSCOPY      WITH BLADDER BIOPSY    LYMPH NODE DISSECTION Left     AXILLARY    SINUS SURGERY Bilateral        FAMILY HISTORY    Family History   Problem Relation Age of Onset    Diabetes Father        SOCIAL HISTORY    Social History     Tobacco Use    Smoking status: Never    Smokeless tobacco: Never   Vaping Use    Vaping Use: Never used   Substance Use Topics    Alcohol use: No    Drug use: No       ALLERGIES    Allergies   Allergen Reactions    Pcn [Penicillins]      Skin tested positive as child    Sulfa Antibiotics Nausea Only    Triamcinolone     Povidone Iodine Rash    Triamcinolone Hexacetonide Rash     Topical Kenalog only. Patient reports having multiple celestone injections without rash       MEDICATIONS    Current Outpatient Medications on File Prior to Visit   Medication Sig Dispense Refill    Omega-3 Fatty Acids (FISH OIL) 1000 MG capsule Take by mouth      Multiple Vitamin (MULTI-VITAMIN DAILY PO) Take by mouth      aspirin 81 MG chewable tablet Take 81 mg by mouth every other day      ascorbic acid (VITAMIN C) 500 MG tablet Take 500 mg by mouth daily      vitamin D (CHOLECALCIFEROL) 125 MCG (5000 UT) CAPS capsule Take 5,000 Units by mouth daily       No current facility-administered medications on file prior to visit. Review of Systems  General: (-) weight change, fatigue, weakness, fever, chills, night sweats  Head: (-) trauma, heacache location, frequency, nausea, vomiting, visual changes  Eyes: (-) glasses, contact lenses, blurriness, tearing, itching, acute visual changes  Ears: (-) hearing loss, tinnitus, vertigo, discharge, earache  Skin: (-) rash, subcutaneous lesion, open ulceration      Objective:      /68   Pulse 82   Resp 16   Ht 6' 1\" (1.854 m)   Wt 177 lb (80.3 kg)   SpO2 99%   BMI 23.35 kg/m²     Wt Readings from Last 3 Encounters:   02/02/23 177 lb (80.3 kg)   09/22/22 177 lb (80.3 kg)   08/18/22 177 lb (80.3 kg)       PHYSICAL EXAMINATION  CONSTITUTIONAL:  awake, alert, cooperative, no apparent distress, and appears stated age  Vascular: Pedal pulses are palpable skin temperature within normal means capillary fill time intact to lesser toes.   Dermatologic: Skin envelope appears well maintained, hydrated  Neurovascular: Epicritic sensations grossly intact  Musculoskeletal: Patient is noted to have 5 of 5 muscle strength to all extrinsic muscle groups tested. Patient noted to have a tight gastroc soleal complex with knee both extended and flexed. Increased pain over distribution of his medial plantar fascial band right forefoot indicative of underlying plantar fasciitis which is exacerbated with dorsiflexion of his lesser metatarsophalangeal joints. Patient is also noted to have findings of some mild joint crepitus with range of motion of his left first metatarsal phalange joint indicative of hallux limitus. IMAGING:  N/a    LABS       CBC:   Lab Results   Component Value Date/Time    WBC 9.7 07/05/2016 04:53 PM    HGB 14.1 07/05/2016 04:53 PM    HCT 41.8 07/05/2016 04:53 PM    MCV 88.2 07/05/2016 04:53 PM     07/05/2016 04:53 PM     BMP:   Lab Results   Component Value Date/Time     07/05/2016 04:53 PM    K 3.5 07/05/2016 04:53 PM    CL 99 07/05/2016 04:53 PM    CO2 24 07/05/2016 04:53 PM    BUN 19 07/05/2016 04:53 PM    CREATININE 0.83 07/05/2016 04:53 PM     PT/INR: No results found for: PROTIME, INR  Prealbumin: No results found for: PREALBUMIN  Albumin:No results found for: LABALBU  Sed Rate:No results found for: SEDRATE  CRP: No results found for: CRP  Micro: No results found for: BC   Hemoglobin A1C: No results found for: LABA1C    Assessment:      Diagnosis Orders   1.  Plantar fascial fibromatosis  DME Order for Orthosis as OP           Patient Active Problem List   Diagnosis    Plantar fascial fibromatosis R/L    Acquired deformity of toe   2.hallux limitus, left      Procedure Note  N/a    Plan:     Patient examined and evaluated today  Patient will continue with at home doctor at the stretching  Was given a handout to obtain some full-length silicone after market insoles to place over his current custom orthotics  Updated prescription for functional-based full-length orthotics  Follow-up as needed        Shelbi Hopper, [address]   Electronically signed by Mohsen Chamorro DPM on 2/2/2023 at 5:55 PM          Procedures    DME Order for Orthosis as OP     Eval and treat. Please provide:  Custom molded orthotics. Neutral full length orthotic with neutral extrinsic heel post, metatarsal offloading pad with dancer pad underlay. Thick cushion top cover.        Orthopedic Surgery, Foot and Ankle

## 2023-02-17 ENCOUNTER — HOSPITAL ENCOUNTER (OUTPATIENT)
Dept: GENERAL RADIOLOGY | Age: 68
End: 2023-02-17
Payer: MEDICARE

## 2023-02-17 DIAGNOSIS — M79.605 LEG PAIN, ANTERIOR, LEFT: ICD-10-CM

## 2023-02-17 PROCEDURE — 73552 X-RAY EXAM OF FEMUR 2/>: CPT

## 2023-08-03 ENCOUNTER — HOSPITAL ENCOUNTER (OUTPATIENT)
Age: 68
Discharge: HOME | End: 2023-08-03
Payer: MEDICARE

## 2023-08-03 LAB
ALBUMIN SERPL-MCNC: 4.6 G/DL (ref 3.5–5.2)
ALBUMIN/GLOB SERPL: 1.8 {RATIO} (ref 1–2.5)
ALP SERPL-CCNC: 70 U/L (ref 40–129)
ALT SERPL-CCNC: 18 U/L (ref 5–41)
ANION GAP SERPL CALCULATED.3IONS-SCNC: 9 MMOL/L (ref 9–17)
AST SERPL-CCNC: 19 U/L
BILIRUB SERPL-MCNC: 1.1 MG/DL (ref 0.3–1.2)
BUN SERPL-MCNC: 19 MG/DL (ref 8–23)
BUN/CREAT SERPL: 21 (ref 9–20)
CALCIUM SERPL-MCNC: 9.5 MG/DL (ref 8.6–10.4)
CHLORIDE SERPL-SCNC: 97 MMOL/L (ref 98–107)
CHOLEST SERPL-MCNC: 202 MG/DL
CHOLESTEROL/HDL RATIO: 4.7
CO2 SERPL-SCNC: 28 MMOL/L (ref 20–31)
CREAT SERPL-MCNC: 0.9 MG/DL (ref 0.7–1.2)
GFR SERPL CREATININE-BSD FRML MDRD: >60 ML/MIN/1.73M2
GLUCOSE SERPL-MCNC: 101 MG/DL (ref 70–99)
HDLC SERPL-MCNC: 43 MG/DL
LDLC SERPL CALC-MCNC: 134 MG/DL (ref 0–130)
POTASSIUM SERPL-SCNC: 4.1 MMOL/L (ref 3.7–5.3)
PROT SERPL-MCNC: 7.2 G/DL (ref 6.4–8.3)
PSA SERPL-MCNC: 2.06 NG/ML
SODIUM SERPL-SCNC: 134 MMOL/L (ref 135–144)
TRIGL SERPL-MCNC: 127 MG/DL

## 2023-08-03 PROCEDURE — 80053 COMPREHEN METABOLIC PANEL: CPT

## 2023-08-03 PROCEDURE — 36415 COLL VENOUS BLD VENIPUNCTURE: CPT

## 2023-08-03 PROCEDURE — G0103 PSA SCREENING: HCPCS

## 2023-08-03 PROCEDURE — 80061 LIPID PANEL: CPT

## 2023-08-18 ENCOUNTER — TELEPHONE (OUTPATIENT)
Dept: SURGERY | Age: 68
End: 2023-08-18

## 2023-08-18 PROBLEM — E78.2 MIXED HYPERLIPIDEMIA: Status: ACTIVE | Noted: 2023-08-18

## 2023-08-18 PROBLEM — M67.919 DISORDER OF BURSAE AND TENDONS IN SHOULDER REGION: Status: ACTIVE | Noted: 2023-08-18

## 2023-08-18 PROBLEM — R49.0 HOARSENESS: Status: ACTIVE | Noted: 2023-08-18

## 2023-08-18 PROBLEM — K21.00 REFLUX ESOPHAGITIS: Status: ACTIVE | Noted: 2023-08-18

## 2023-08-18 PROBLEM — K64.5 THROMBOSED HEMORRHOIDS: Status: ACTIVE | Noted: 2023-08-18

## 2023-08-18 PROBLEM — F32.A DEPRESSION: Status: ACTIVE | Noted: 2023-08-18

## 2023-08-18 PROBLEM — M71.9 DISORDER OF BURSAE AND TENDONS IN SHOULDER REGION: Status: ACTIVE | Noted: 2023-08-18

## 2023-08-18 PROBLEM — M19.039 PRIMARY OSTEOARTHRITIS OF WRIST: Status: ACTIVE | Noted: 2023-08-18

## 2023-08-18 PROBLEM — N41.1 CHRONIC PROSTATITIS: Status: ACTIVE | Noted: 2023-08-18

## 2023-08-18 NOTE — TELEPHONE ENCOUNTER
Patient called back and all answers regarding colonoscopy and appointment on Monday was answered. Patient verbalized understanding.

## 2023-08-18 NOTE — TELEPHONE ENCOUNTER
Received message from  stating patient called and would like to speak to one of Dr. Tacy Litten. Attempted to contact patient and left a message requesting patient call back. Clinic number provided on voicemail.

## 2023-08-21 ENCOUNTER — INITIAL CONSULT (OUTPATIENT)
Dept: SURGERY | Age: 68
End: 2023-08-21
Payer: MEDICARE

## 2023-08-21 VITALS
DIASTOLIC BLOOD PRESSURE: 68 MMHG | BODY MASS INDEX: 20.54 KG/M2 | WEIGHT: 155 LBS | TEMPERATURE: 97.4 F | HEIGHT: 73 IN | SYSTOLIC BLOOD PRESSURE: 122 MMHG | RESPIRATION RATE: 14 BRPM | HEART RATE: 64 BPM

## 2023-08-21 DIAGNOSIS — Z12.11 COLON CANCER SCREENING: Primary | ICD-10-CM

## 2023-08-21 PROCEDURE — G8420 CALC BMI NORM PARAMETERS: HCPCS | Performed by: SURGERY

## 2023-08-21 PROCEDURE — 99213 OFFICE O/P EST LOW 20 MIN: CPT | Performed by: SURGERY

## 2023-08-21 PROCEDURE — G8427 DOCREV CUR MEDS BY ELIG CLIN: HCPCS | Performed by: SURGERY

## 2023-08-21 PROCEDURE — 99215 OFFICE O/P EST HI 40 MIN: CPT | Performed by: SURGERY

## 2023-08-21 PROCEDURE — 3017F COLORECTAL CA SCREEN DOC REV: CPT | Performed by: SURGERY

## 2023-08-21 PROCEDURE — 1123F ACP DISCUSS/DSCN MKR DOCD: CPT | Performed by: SURGERY

## 2023-08-21 PROCEDURE — 1036F TOBACCO NON-USER: CPT | Performed by: SURGERY

## 2023-08-21 NOTE — PROGRESS NOTES
Denisse Pollock is a 74 y.o. male      CC:    Screening colonoscopy    HISTORY OF PRESENT ILLNESS:    Pt is 75 yo M needs screening CS due to + fam hx . He just wanted to talk to the doctor before setting up.        Reason for evaluation: screening and family hx of colon cancer                Abd pain: no  Anemia: no  Bloating:no  Diarrhea: no  Constipation: no  Melena: no  Hematochezia:no  Rectal Bleeding:no  Rectal/Anal Pain:no  Pruritus: no  Family history colon Cancer: yes, father dx at age 67  Previous colon cancer: no  Previous Colon Polyp: no  Change in bowels: no  Decrease caliber of stool: no  Change in color of stool: no     Previous work up date: Colonoscopy on 5/18/2018 at [facility] by Dr. Gonzales Olguin colon          Review of Systems:    General:  Fever: Negative  Weight Change:Negative  Night Sweats: Negative    Eye:  Blurry Vision:Negative  Double Vision: Negative    Ent:  Headaches: Negative  Sore throat: Negative    Allergy/Immunology:  Hives: Negative  Latex allergy: Negative    Hematology/Lymphatic:  Bleeding Problems: Negative  Blood Clots: Negative  Swollen Lymph Nodes: Negative    Lungs:  Cough: Negative  SOB: Negative  Wheezing:Negative    Cardiovascular:  Chest Pain: Negative  Palpitations:Negative    GI:   Decreased Appetite: Negative  Heartburn: Negative  Dysphagia: Negative  Nausea/Vomiting: Negative  Abdominal Pain: Negative  Change in Bowels:Negative  Constipation: Negative  Diarrhea: Negative  Rectal Bleeding: Negative    :   Dysuria: Negative  Increase Urinary Frequency/Urgency: Negative    Neuro:  Seizures: Negative  Confusion: Negative        PAST MEDICAL HISTORY:      Family History   Problem Relation Age of Onset    Diabetes Mother     Pneumonia Mother     Diabetes Father     Colon Cancer Father 67    Stroke Maternal Grandmother     Heart Failure Paternal Grandfather      Social History     Socioeconomic History    Marital status:      Spouse name: Not on file

## 2023-08-21 NOTE — PROGRESS NOTES
Reason for evaluation: screening and family hx of colon cancer     Abd pain: no  Anemia: no  Bloating:no  Diarrhea: no  Constipation: no  Melena: no  Hematochezia:no  Rectal Bleeding:no  Rectal/Anal Pain:no  Pruritus: no  Family history colon Cancer: yes, father dx at age 67  Previous colon cancer: no  Previous Colon Polyp: no  Change in bowels: no  Decrease caliber of stool: no  Change in color of stool: no    Previous work up date: Colonoscopy on 5/18/2018 at [facility] by Dr. Eda levine

## 2023-08-28 ENCOUNTER — HOSPITAL ENCOUNTER (OUTPATIENT)
Dept: GENERAL RADIOLOGY | Age: 68
Discharge: HOME | End: 2023-08-30
Payer: MEDICARE

## 2023-08-28 ENCOUNTER — OFFICE VISIT (OUTPATIENT)
Dept: PRIMARY CARE CLINIC | Age: 68
End: 2023-08-28
Payer: MEDICARE

## 2023-08-28 VITALS
TEMPERATURE: 97.3 F | BODY MASS INDEX: 20.74 KG/M2 | DIASTOLIC BLOOD PRESSURE: 62 MMHG | OXYGEN SATURATION: 99 % | HEART RATE: 76 BPM | WEIGHT: 156.5 LBS | RESPIRATION RATE: 18 BRPM | HEIGHT: 73 IN | SYSTOLIC BLOOD PRESSURE: 98 MMHG

## 2023-08-28 DIAGNOSIS — M79.671 RIGHT FOOT PAIN: ICD-10-CM

## 2023-08-28 DIAGNOSIS — M79.671 RIGHT FOOT PAIN: Primary | ICD-10-CM

## 2023-08-28 PROCEDURE — 1036F TOBACCO NON-USER: CPT | Performed by: STUDENT IN AN ORGANIZED HEALTH CARE EDUCATION/TRAINING PROGRAM

## 2023-08-28 PROCEDURE — 1123F ACP DISCUSS/DSCN MKR DOCD: CPT | Performed by: STUDENT IN AN ORGANIZED HEALTH CARE EDUCATION/TRAINING PROGRAM

## 2023-08-28 PROCEDURE — G8427 DOCREV CUR MEDS BY ELIG CLIN: HCPCS | Performed by: STUDENT IN AN ORGANIZED HEALTH CARE EDUCATION/TRAINING PROGRAM

## 2023-08-28 PROCEDURE — G8420 CALC BMI NORM PARAMETERS: HCPCS | Performed by: STUDENT IN AN ORGANIZED HEALTH CARE EDUCATION/TRAINING PROGRAM

## 2023-08-28 PROCEDURE — 99212 OFFICE O/P EST SF 10 MIN: CPT | Performed by: STUDENT IN AN ORGANIZED HEALTH CARE EDUCATION/TRAINING PROGRAM

## 2023-08-28 PROCEDURE — 3017F COLORECTAL CA SCREEN DOC REV: CPT | Performed by: STUDENT IN AN ORGANIZED HEALTH CARE EDUCATION/TRAINING PROGRAM

## 2023-08-28 PROCEDURE — 99203 OFFICE O/P NEW LOW 30 MIN: CPT | Performed by: STUDENT IN AN ORGANIZED HEALTH CARE EDUCATION/TRAINING PROGRAM

## 2023-08-28 PROCEDURE — 73630 X-RAY EXAM OF FOOT: CPT

## 2023-08-28 ASSESSMENT — ENCOUNTER SYMPTOMS
CONSTIPATION: 0
NAUSEA: 0
TROUBLE SWALLOWING: 0
ABDOMINAL PAIN: 0
VOMITING: 0
SHORTNESS OF BREATH: 0
COUGH: 0
BLOOD IN STOOL: 0
DIARRHEA: 0
EYE PAIN: 0

## 2023-08-28 NOTE — PROGRESS NOTES
[facility]             [address], [address]                        Telephone [phone]             Fax [phone]       Cole Moore  :  1955  Age:  74 y.o. MRN:  [id number]  Date of visit:  2023     Assessment and Plan:    1. Right foot pain  Right foot pain along the MTP and fifth toe on the right foot. We will check x-ray at this time to rule out fracture. Recommend return to the urgent care if symptoms worsen or fail to improve. Can take anti-inflammatories as needed for this. Recommend wearing flat closed toed shoe while experiencing soreness. - XR FOOT RIGHT (MIN 3 VIEWS); Future      Subjective:    Cole Moore is a 74 y.o. male who presents to [facility] today (2023) for evaluation of:  Foot Pain (Was working in the yard , states he was pushing pee gravel with his right foot, foot pain started later that evening/early morning Saturday. Soaked foot in epsom salt, which he feels helped a little. Aching.)    Patient is a 82-year-old male who presents to the urgent care today for evaluation of right foot pain ongoing since . He states that he was working in his yard with some pea gravel when he started to experience foot pain later that evening. He states that he soaked his foot in Epsom salts which may be helped somewhat. He states it is an aching pain and not necessarily a sharp pain. Worse to walk on it barefoot. Chief Complaint   Patient presents with    Foot Pain     Was working in the yard  afternoon, states he was pushing pee gravel with his right foot, foot pain started later that evening/early morning Saturday. Soaked foot in epsom salt, which he feels helped a little. Aching.      He has the following problem list:  Patient Active Problem List   Diagnosis    Plantar fascial fibromatosis R/L    Acquired deformity of toe

## 2023-09-25 ENCOUNTER — OFFICE VISIT (OUTPATIENT)
Dept: ORTHOPEDIC SURGERY | Age: 68
End: 2023-09-25
Payer: MEDICARE

## 2023-09-25 VITALS
HEIGHT: 73 IN | BODY MASS INDEX: 20.67 KG/M2 | DIASTOLIC BLOOD PRESSURE: 76 MMHG | SYSTOLIC BLOOD PRESSURE: 122 MMHG | WEIGHT: 156 LBS

## 2023-09-25 DIAGNOSIS — M20.5X2 HALLUX LIMITUS, ACQUIRED, LEFT: ICD-10-CM

## 2023-09-25 DIAGNOSIS — M72.2 PLANTAR FASCIAL FIBROMATOSIS: Primary | ICD-10-CM

## 2023-09-25 PROCEDURE — 3017F COLORECTAL CA SCREEN DOC REV: CPT | Performed by: NURSE PRACTITIONER

## 2023-09-25 PROCEDURE — G8427 DOCREV CUR MEDS BY ELIG CLIN: HCPCS | Performed by: NURSE PRACTITIONER

## 2023-09-25 PROCEDURE — 1036F TOBACCO NON-USER: CPT | Performed by: NURSE PRACTITIONER

## 2023-09-25 PROCEDURE — 99213 OFFICE O/P EST LOW 20 MIN: CPT | Performed by: NURSE PRACTITIONER

## 2023-09-25 PROCEDURE — 1123F ACP DISCUSS/DSCN MKR DOCD: CPT | Performed by: NURSE PRACTITIONER

## 2023-09-25 PROCEDURE — 99214 OFFICE O/P EST MOD 30 MIN: CPT | Performed by: NURSE PRACTITIONER

## 2023-09-25 PROCEDURE — G8420 CALC BMI NORM PARAMETERS: HCPCS | Performed by: NURSE PRACTITIONER

## 2023-09-25 RX ORDER — MELOXICAM 15 MG/1
15 TABLET ORAL DAILY PRN
Qty: 30 TABLET | Refills: 0 | Status: SHIPPED | OUTPATIENT
Start: 2023-09-25

## 2023-10-02 NOTE — PROGRESS NOTES
No results found for: \"CRP\"  Micro: No results found for: \"BC\"   Hemoglobin A1C: No results found for: \"LABA1C\"    Assessment:      Diagnosis Orders   1. Plantar fascial fibromatosis  meloxicam (MOBIC) 15 MG tablet      2. Hallux limitus, acquired, left  meloxicam (MOBIC) 15 MG tablet           Patient Active Problem List   Diagnosis    Plantar fascial fibromatosis R/L    Acquired deformity of toe    Depression    GERD with esophagitis    Primary osteoarthritis of wrist    Disorder of bursae and tendons in shoulder region    Mixed hyperlipidemia    Hoarseness    Chronic prostatitis    Thrombosed hemorrhoids   2.hallux limitus, left      Procedure Note  N/a    Plan:     Patient examined and evaluated today. Patient was encouraged to continue with good supportive tennis shoes as well as looking into full-length silicone orthotics. Patient will continue with at home doctor directed stretching. Meloxicam was refilled for patient. Patient follow back up with us on as-needed basis. Derick South [facility]   Electronically signed by PING South CNP on 10/2/2023 at 7:48 AM      No orders of the defined types were placed in this encounter.

## 2023-11-18 ENCOUNTER — OFFICE VISIT (OUTPATIENT)
Dept: PRIMARY CARE CLINIC | Age: 68
End: 2023-11-18
Payer: MEDICARE

## 2023-11-18 VITALS
SYSTOLIC BLOOD PRESSURE: 100 MMHG | OXYGEN SATURATION: 99 % | DIASTOLIC BLOOD PRESSURE: 70 MMHG | HEART RATE: 78 BPM | HEIGHT: 73 IN | RESPIRATION RATE: 14 BRPM | WEIGHT: 164 LBS | TEMPERATURE: 98.1 F | BODY MASS INDEX: 21.74 KG/M2

## 2023-11-18 DIAGNOSIS — R05.8 OTHER COUGH: Primary | ICD-10-CM

## 2023-11-18 DIAGNOSIS — J06.9 ACUTE UPPER RESPIRATORY INFECTION: ICD-10-CM

## 2023-11-18 LAB
Lab: NORMAL
QC PASS/FAIL: NORMAL
SARS-COV-2 RDRP RESP QL NAA+PROBE: NEGATIVE

## 2023-11-18 PROCEDURE — PBSHW POCT COVID-19 RAPID, NAAT: Performed by: FAMILY MEDICINE

## 2023-11-18 PROCEDURE — G8420 CALC BMI NORM PARAMETERS: HCPCS | Performed by: FAMILY MEDICINE

## 2023-11-18 PROCEDURE — G8427 DOCREV CUR MEDS BY ELIG CLIN: HCPCS | Performed by: FAMILY MEDICINE

## 2023-11-18 PROCEDURE — 3017F COLORECTAL CA SCREEN DOC REV: CPT | Performed by: FAMILY MEDICINE

## 2023-11-18 PROCEDURE — 99213 OFFICE O/P EST LOW 20 MIN: CPT | Performed by: FAMILY MEDICINE

## 2023-11-18 PROCEDURE — 99212 OFFICE O/P EST SF 10 MIN: CPT | Performed by: FAMILY MEDICINE

## 2023-11-18 PROCEDURE — 1036F TOBACCO NON-USER: CPT | Performed by: FAMILY MEDICINE

## 2023-11-18 PROCEDURE — 1123F ACP DISCUSS/DSCN MKR DOCD: CPT | Performed by: FAMILY MEDICINE

## 2023-11-18 PROCEDURE — G8484 FLU IMMUNIZE NO ADMIN: HCPCS | Performed by: FAMILY MEDICINE

## 2023-11-18 PROCEDURE — 87635 SARS-COV-2 COVID-19 AMP PRB: CPT | Performed by: FAMILY MEDICINE

## 2023-11-18 ASSESSMENT — ENCOUNTER SYMPTOMS
SORE THROAT: 1
EYES NEGATIVE: 1
RESPIRATORY NEGATIVE: 1
COUGH: 0
GASTROINTESTINAL NEGATIVE: 1
RHINORRHEA: 1

## 2023-11-18 NOTE — PROGRESS NOTES
Subjective:      Patient ID: Ant Yates is a 79 y.o. male. HPI  acute walk in clinic visit for uri symptoms starting in the last 2 days. Rhinorrhea , congestion, mild sore throat. No fever or coughing . Wanting to rule out covid. Past Medical History:   Diagnosis Date    Acquired deformity of toe     Chronic prostatitis     Disorder of bursae and tendons in shoulder region     GERD with esophagitis     Hoarseness     Mixed hyperlipidemia     Primary osteoarthritis of wrist     Reflux esophagitis     Thrombosed hemorrhoids      Past Surgical History:   Procedure Laterality Date    COLONOSCOPY  05/18/2018    Tortuous sigmoid colon By Dr. Venkat Dowd @ [facility]    COLONOSCOPY  2011    normal    COLONOSCOPY  2007    CYSTOSCOPY      WITH BLADDER BIOPSY    ESOPHAGOGASTRODUODENOSCOPY  05/14/2018    by Dr. Hilton Conti @ [address] [place] [address]  2013    Thrombosed    HERNIA REPAIR Right 2021    inguinal right with mesh; laparoscopic at [facility] Left     AXILLARY- benign    OTHER SURGICAL HISTORY      PE tubes    SINUS SURGERY Bilateral 01/01/1994    TONSILLECTOMY AND ADENOIDECTOMY      TOOTH EXTRACTION  06/2017     Current Outpatient Medications   Medication Sig Dispense Refill    Omega-3 Fatty Acids (FISH OIL) 1000 MG capsule Take by mouth      Multiple Vitamin (MULTI-VITAMIN DAILY PO) Take by mouth      aspirin 81 MG chewable tablet Take 1 tablet by mouth every other day      vitamin D (CHOLECALCIFEROL) 125 MCG (5000 UT) CAPS capsule Take 1 capsule by mouth daily      meloxicam (MOBIC) 15 MG tablet Take 1 tablet by mouth daily as needed for Pain (Patient not taking: Reported on 11/18/2023) 30 tablet 0     No current facility-administered medications for this visit. Review of Systems   Constitutional: Negative. Negative for fever. HENT:  Positive for congestion, rhinorrhea and sore throat. Eyes: Negative. Respiratory: Negative. Negative for cough.

## 2023-11-22 ENCOUNTER — OFFICE VISIT (OUTPATIENT)
Dept: PRIMARY CARE CLINIC | Age: 68
End: 2023-11-22
Payer: MEDICARE

## 2023-11-22 VITALS
SYSTOLIC BLOOD PRESSURE: 110 MMHG | OXYGEN SATURATION: 99 % | BODY MASS INDEX: 21.9 KG/M2 | DIASTOLIC BLOOD PRESSURE: 76 MMHG | HEART RATE: 68 BPM | RESPIRATION RATE: 15 BRPM | HEIGHT: 73 IN | TEMPERATURE: 97.3 F | WEIGHT: 165.25 LBS

## 2023-11-22 DIAGNOSIS — J02.9 SORE THROAT: Primary | ICD-10-CM

## 2023-11-22 LAB
INFLUENZA A ANTIGEN, POC: NEGATIVE
INFLUENZA B ANTIGEN, POC: NEGATIVE
LOT EXPIRE DATE: NORMAL
LOT KIT NUMBER: NORMAL
SARS-COV-2, POC: NORMAL
VALID INTERNAL CONTROL: NORMAL
VENDOR AND KIT NAME POC: NORMAL

## 2023-11-22 PROCEDURE — 87428 SARSCOV & INF VIR A&B AG IA: CPT | Performed by: FAMILY MEDICINE

## 2023-11-22 PROCEDURE — G8484 FLU IMMUNIZE NO ADMIN: HCPCS | Performed by: FAMILY MEDICINE

## 2023-11-22 PROCEDURE — 1123F ACP DISCUSS/DSCN MKR DOCD: CPT | Performed by: FAMILY MEDICINE

## 2023-11-22 PROCEDURE — 3017F COLORECTAL CA SCREEN DOC REV: CPT | Performed by: FAMILY MEDICINE

## 2023-11-22 PROCEDURE — PBSHW POCT COVID-19 & INFLUENZA A/B: Performed by: FAMILY MEDICINE

## 2023-11-22 PROCEDURE — G8427 DOCREV CUR MEDS BY ELIG CLIN: HCPCS | Performed by: FAMILY MEDICINE

## 2023-11-22 PROCEDURE — 99213 OFFICE O/P EST LOW 20 MIN: CPT | Performed by: FAMILY MEDICINE

## 2023-11-22 PROCEDURE — G8420 CALC BMI NORM PARAMETERS: HCPCS | Performed by: FAMILY MEDICINE

## 2023-11-22 PROCEDURE — 99212 OFFICE O/P EST SF 10 MIN: CPT | Performed by: FAMILY MEDICINE

## 2023-11-22 PROCEDURE — 1036F TOBACCO NON-USER: CPT | Performed by: FAMILY MEDICINE

## 2023-11-22 RX ORDER — AZITHROMYCIN 250 MG/1
250 TABLET, FILM COATED ORAL SEE ADMIN INSTRUCTIONS
Qty: 6 TABLET | Refills: 0 | Status: SHIPPED | OUTPATIENT
Start: 2023-11-22 | End: 2023-11-27

## 2023-11-22 NOTE — PATIENT INSTRUCTIONS
It is often difficult to tell whether symptoms are due to a viral illness or a bacterial illness. You can continue to use over-the-counter medication for symptomatic treatment. I also provided a prescription for a Zpak. Begin taking the prescription if your symptoms worsen or do not improve in the next 3-4 days.

## 2023-11-22 NOTE — PROGRESS NOTES
[id number]             [address], [address] [facility]                        Telephone [phone]             Fax [phone]       Laura Armendariz  :  1955  Age:  79 y.o. MRN:  [id number]  Date of visit:  2023       Assessment & Plan:    Sore throat  I reviewed the results of testing done today with the patient. I discussed with Meena Armstrong that it is difficult to tell at this time whether his symptoms are due to a viral illness or a bacterial illness. I have advised him to use over-the-counter medication for symptomatic treatment, but I also provided him with a prescription for Azithromycin. I advised him to fill the prescription if his symptoms worsen or do not improve in the next 3-4 days:   - azithromycin (ZITHROMAX) 250 MG tablet; Take 1 tablet by mouth See Admin Instructions for 5 days 500mg on day 1 followed by 250mg on days 2 - 5  Dispense: 6 tablet; Refill: 0    He was advised to follow up if symptoms worsen or do not resolve. Subjective:    Laura Armendariz is a 79 y.o. male who presents to [id number] today (2023) for evaluation of:  Sinus Problem (He has a sore throat, and runny nose. He was seen last week he was tested for covid last week and wanted to retest today. )      He reports a sore throat and nasal drainage. He was seen here at [facility] on 2023, and he had a negative Covid test at that visit. He states that he was feeling better until this morning when he woke up with a sore throat throat and a hoarse voice.         Current medications are:  Current Outpatient Medications   Medication Sig Dispense Refill    Omega-3 Fatty Acids (FISH OIL) 1000 MG capsule Take by mouth      Multiple Vitamin (MULTI-VITAMIN DAILY PO) Take by mouth      aspirin 81 MG chewable tablet Take 1 tablet by mouth every other day      vitamin D (CHOLECALCIFEROL) 125 MCG (5000 UT) CAPS capsule Take 1 capsule

## 2023-12-07 ENCOUNTER — OFFICE VISIT (OUTPATIENT)
Dept: PRIMARY CARE CLINIC | Age: 68
End: 2023-12-07
Payer: MEDICARE

## 2023-12-07 VITALS
DIASTOLIC BLOOD PRESSURE: 72 MMHG | HEART RATE: 68 BPM | SYSTOLIC BLOOD PRESSURE: 124 MMHG | BODY MASS INDEX: 22.13 KG/M2 | OXYGEN SATURATION: 99 % | WEIGHT: 167 LBS | HEIGHT: 73 IN

## 2023-12-07 DIAGNOSIS — Z00.00 NORMAL EXAM: Primary | ICD-10-CM

## 2023-12-07 PROCEDURE — 1123F ACP DISCUSS/DSCN MKR DOCD: CPT | Performed by: NURSE PRACTITIONER

## 2023-12-07 PROCEDURE — 3017F COLORECTAL CA SCREEN DOC REV: CPT | Performed by: NURSE PRACTITIONER

## 2023-12-07 PROCEDURE — 99212 OFFICE O/P EST SF 10 MIN: CPT | Performed by: NURSE PRACTITIONER

## 2023-12-07 PROCEDURE — G8427 DOCREV CUR MEDS BY ELIG CLIN: HCPCS | Performed by: NURSE PRACTITIONER

## 2023-12-07 PROCEDURE — 99213 OFFICE O/P EST LOW 20 MIN: CPT | Performed by: NURSE PRACTITIONER

## 2023-12-07 PROCEDURE — 1036F TOBACCO NON-USER: CPT | Performed by: NURSE PRACTITIONER

## 2023-12-07 PROCEDURE — G8420 CALC BMI NORM PARAMETERS: HCPCS | Performed by: NURSE PRACTITIONER

## 2023-12-07 PROCEDURE — G8484 FLU IMMUNIZE NO ADMIN: HCPCS | Performed by: NURSE PRACTITIONER

## 2023-12-07 ASSESSMENT — PATIENT HEALTH QUESTIONNAIRE - PHQ9
SUM OF ALL RESPONSES TO PHQ QUESTIONS 1-9: 0
4. FEELING TIRED OR HAVING LITTLE ENERGY: 0
5. POOR APPETITE OR OVEREATING: 0
1. LITTLE INTEREST OR PLEASURE IN DOING THINGS: 0
6. FEELING BAD ABOUT YOURSELF - OR THAT YOU ARE A FAILURE OR HAVE LET YOURSELF OR YOUR FAMILY DOWN: 0
9. THOUGHTS THAT YOU WOULD BE BETTER OFF DEAD, OR OF HURTING YOURSELF: 0
7. TROUBLE CONCENTRATING ON THINGS, SUCH AS READING THE NEWSPAPER OR WATCHING TELEVISION: 0
SUM OF ALL RESPONSES TO PHQ9 QUESTIONS 1 & 2: 0
10. IF YOU CHECKED OFF ANY PROBLEMS, HOW DIFFICULT HAVE THESE PROBLEMS MADE IT FOR YOU TO DO YOUR WORK, TAKE CARE OF THINGS AT HOME, OR GET ALONG WITH OTHER PEOPLE: 0
2. FEELING DOWN, DEPRESSED OR HOPELESS: 0
3. TROUBLE FALLING OR STAYING ASLEEP: 0

## 2023-12-07 ASSESSMENT — COLUMBIA-SUICIDE SEVERITY RATING SCALE - C-SSRS
7. DID THIS OCCUR IN THE LAST THREE MONTHS: NO
4. HAVE YOU HAD THESE THOUGHTS AND HAD SOME INTENTION OF ACTING ON THEM?: NO
5. HAVE YOU STARTED TO WORK OUT OR WORKED OUT THE DETAILS OF HOW TO KILL YOURSELF? DO YOU INTEND TO CARRY OUT THIS PLAN?: NO
3. HAVE YOU BEEN THINKING ABOUT HOW YOU MIGHT KILL YOURSELF?: NO

## 2023-12-07 ASSESSMENT — ENCOUNTER SYMPTOMS
BLURRED VISION: 0
ORTHOPNEA: 0
SHORTNESS OF BREATH: 0
RESPIRATORY NEGATIVE: 1

## 2024-01-15 ENCOUNTER — OFFICE VISIT (OUTPATIENT)
Dept: ORTHOPEDIC SURGERY | Age: 69
End: 2024-01-15
Payer: MEDICARE

## 2024-01-15 VITALS
HEIGHT: 73 IN | BODY MASS INDEX: 22.13 KG/M2 | DIASTOLIC BLOOD PRESSURE: 68 MMHG | WEIGHT: 167 LBS | SYSTOLIC BLOOD PRESSURE: 118 MMHG

## 2024-01-15 DIAGNOSIS — M79.671 PAIN OF RIGHT HEEL: Primary | ICD-10-CM

## 2024-01-15 DIAGNOSIS — M72.2 PLANTAR FASCIITIS: ICD-10-CM

## 2024-01-15 PROCEDURE — 99214 OFFICE O/P EST MOD 30 MIN: CPT | Performed by: PODIATRIST

## 2024-01-15 PROCEDURE — G8427 DOCREV CUR MEDS BY ELIG CLIN: HCPCS | Performed by: PODIATRIST

## 2024-01-15 PROCEDURE — 99213 OFFICE O/P EST LOW 20 MIN: CPT | Performed by: PODIATRIST

## 2024-01-15 PROCEDURE — 1036F TOBACCO NON-USER: CPT | Performed by: PODIATRIST

## 2024-01-15 PROCEDURE — G8484 FLU IMMUNIZE NO ADMIN: HCPCS | Performed by: PODIATRIST

## 2024-01-15 PROCEDURE — G8420 CALC BMI NORM PARAMETERS: HCPCS | Performed by: PODIATRIST

## 2024-01-15 PROCEDURE — 1123F ACP DISCUSS/DSCN MKR DOCD: CPT | Performed by: PODIATRIST

## 2024-01-15 PROCEDURE — L4397 STATIC OR DYNAMI AFO PRE OTS: HCPCS | Performed by: PODIATRIST

## 2024-01-15 PROCEDURE — 3017F COLORECTAL CA SCREEN DOC REV: CPT | Performed by: PODIATRIST

## 2024-01-15 NOTE — PROGRESS NOTES
[facility]         Progress and Procedure Note      Manish Roblero  MEDICAL RECORD NUMBER:  [id number]  AGE: 68 y.o.   GENDER: male  : 1955  EPISODE DATE:  1/15/2024    Subjective:     Chief Complaint   Patient presents with    Foot Pain     Recheck R foot pain/plantar fascial fibromatosis         HISTORY of PRESENT ILLNESS HPI    Patient is a pleasant 68-year-old male well-known to our practice for bilateral foot and ankle pathology.  Patient suffers from moderate findings of underlying fat pad atrophy with heterotrophic bone/spur formation to the plantar inferior aspect of calcaneal bilateral.  Patient currently has a chiropractic  style orthotic in his tennis shoes new balance as well as keen work boots.  Patient's main complaint is more or less residual plantar fasciitis type pain on the plantar inferior surface of his right heel.  Discussed incorporation of topical Voltaren gel prescription sent.  Patient was also fit dispensed a posterior night splint in the office today.  Patient will follow-up in 3 months.  All questions concerns regarding medical management were otherwise addressed.      PAST MEDICAL HISTORY        Diagnosis Date    Acquired deformity of toe     Chronic prostatitis     Disorder of bursae and tendons in shoulder region     GERD with esophagitis     Hoarseness     Mixed hyperlipidemia     Primary osteoarthritis of wrist     Reflux esophagitis     Thrombosed hemorrhoids        PAST SURGICAL HISTORY    Past Surgical History:   Procedure Laterality Date    COLONOSCOPY  2018    Tortuous sigmoid colon By Dr. Santos @ [facility]    COLONOSCOPY      normal    COLONOSCOPY      CYSTOSCOPY      WITH BLADDER BIOPSY    ESOPHAGOGASTRODUODENOSCOPY  2018    by Dr. Trotter @ [facility]    HEMORRHOID SURGERY  2013    Thrombosed    HERNIA REPAIR Right     inguinal right with mesh; laparoscopic at [facility]    LYMPH NODE DISSECTION Left     AXILLARY- benign

## 2024-02-29 ENCOUNTER — HOSPITAL ENCOUNTER (OUTPATIENT)
Age: 69
Discharge: HOME | End: 2024-03-02
Attending: FAMILY MEDICINE
Payer: MEDICARE

## 2024-02-29 DIAGNOSIS — R00.2 PALPITATIONS: ICD-10-CM

## 2024-02-29 DIAGNOSIS — I49.3 VENTRICULAR PREMATURE BEATS: ICD-10-CM

## 2024-02-29 PROCEDURE — 93242 EXT ECG>48HR<7D RECORDING: CPT

## 2024-03-05 ENCOUNTER — HOSPITAL ENCOUNTER (OUTPATIENT)
Dept: GENERAL RADIOLOGY | Age: 69
Discharge: HOME | End: 2024-03-07
Payer: MEDICARE

## 2024-03-05 ENCOUNTER — OFFICE VISIT (OUTPATIENT)
Dept: PRIMARY CARE CLINIC | Age: 69
End: 2024-03-05
Payer: MEDICARE

## 2024-03-05 VITALS
WEIGHT: 168 LBS | DIASTOLIC BLOOD PRESSURE: 60 MMHG | TEMPERATURE: 97.3 F | RESPIRATION RATE: 16 BRPM | HEIGHT: 73 IN | BODY MASS INDEX: 22.26 KG/M2 | OXYGEN SATURATION: 99 % | SYSTOLIC BLOOD PRESSURE: 126 MMHG | HEART RATE: 66 BPM

## 2024-03-05 DIAGNOSIS — M79.671 PAIN OF RIGHT HEEL: Primary | ICD-10-CM

## 2024-03-05 DIAGNOSIS — M79.671 PAIN OF RIGHT HEEL: ICD-10-CM

## 2024-03-05 PROCEDURE — 1123F ACP DISCUSS/DSCN MKR DOCD: CPT | Performed by: NURSE PRACTITIONER

## 2024-03-05 PROCEDURE — 99212 OFFICE O/P EST SF 10 MIN: CPT | Performed by: NURSE PRACTITIONER

## 2024-03-05 PROCEDURE — G8427 DOCREV CUR MEDS BY ELIG CLIN: HCPCS | Performed by: NURSE PRACTITIONER

## 2024-03-05 PROCEDURE — G8420 CALC BMI NORM PARAMETERS: HCPCS | Performed by: NURSE PRACTITIONER

## 2024-03-05 PROCEDURE — 73630 X-RAY EXAM OF FOOT: CPT

## 2024-03-05 PROCEDURE — 1036F TOBACCO NON-USER: CPT | Performed by: NURSE PRACTITIONER

## 2024-03-05 PROCEDURE — 99214 OFFICE O/P EST MOD 30 MIN: CPT | Performed by: NURSE PRACTITIONER

## 2024-03-05 PROCEDURE — G8484 FLU IMMUNIZE NO ADMIN: HCPCS | Performed by: NURSE PRACTITIONER

## 2024-03-05 PROCEDURE — 3017F COLORECTAL CA SCREEN DOC REV: CPT | Performed by: NURSE PRACTITIONER

## 2024-03-05 RX ORDER — IBUPROFEN 200 MG
400 TABLET ORAL EVERY 6 HOURS PRN
COMMUNITY

## 2024-03-05 SDOH — ECONOMIC STABILITY: INCOME INSECURITY: HOW HARD IS IT FOR YOU TO PAY FOR THE VERY BASICS LIKE FOOD, HOUSING, MEDICAL CARE, AND HEATING?: NOT HARD AT ALL

## 2024-03-05 SDOH — ECONOMIC STABILITY: FOOD INSECURITY: WITHIN THE PAST 12 MONTHS, THE FOOD YOU BOUGHT JUST DIDN'T LAST AND YOU DIDN'T HAVE MONEY TO GET MORE.: NEVER TRUE

## 2024-03-05 SDOH — ECONOMIC STABILITY: FOOD INSECURITY: WITHIN THE PAST 12 MONTHS, YOU WORRIED THAT YOUR FOOD WOULD RUN OUT BEFORE YOU GOT MONEY TO BUY MORE.: NEVER TRUE

## 2024-03-05 SDOH — ECONOMIC STABILITY: HOUSING INSECURITY
IN THE LAST 12 MONTHS, WAS THERE A TIME WHEN YOU DID NOT HAVE A STEADY PLACE TO SLEEP OR SLEPT IN A SHELTER (INCLUDING NOW)?: NO

## 2024-03-05 ASSESSMENT — PATIENT HEALTH QUESTIONNAIRE - PHQ9
3. TROUBLE FALLING OR STAYING ASLEEP: 0
8. MOVING OR SPEAKING SO SLOWLY THAT OTHER PEOPLE COULD HAVE NOTICED. OR THE OPPOSITE, BEING SO FIGETY OR RESTLESS THAT YOU HAVE BEEN MOVING AROUND A LOT MORE THAN USUAL: 0
4. FEELING TIRED OR HAVING LITTLE ENERGY: 0
5. POOR APPETITE OR OVEREATING: 0
SUM OF ALL RESPONSES TO PHQ QUESTIONS 1-9: 0
2. FEELING DOWN, DEPRESSED OR HOPELESS: 0
SUM OF ALL RESPONSES TO PHQ9 QUESTIONS 1 & 2: 0
1. LITTLE INTEREST OR PLEASURE IN DOING THINGS: 0
6. FEELING BAD ABOUT YOURSELF - OR THAT YOU ARE A FAILURE OR HAVE LET YOURSELF OR YOUR FAMILY DOWN: 0
SUM OF ALL RESPONSES TO PHQ QUESTIONS 1-9: 0
SUM OF ALL RESPONSES TO PHQ QUESTIONS 1-9: 0
7. TROUBLE CONCENTRATING ON THINGS, SUCH AS READING THE NEWSPAPER OR WATCHING TELEVISION: 0
9. THOUGHTS THAT YOU WOULD BE BETTER OFF DEAD, OR OF HURTING YOURSELF: 0
SUM OF ALL RESPONSES TO PHQ QUESTIONS 1-9: 0

## 2024-03-05 NOTE — PROGRESS NOTES
Feet:      Right foot:      Skin integrity: Skin integrity normal.      Left foot:      Skin integrity: Skin integrity normal.      Comments: Intact ROM  Skin:     General: Skin is warm and dry.      Capillary Refill: Capillary refill takes less than 2 seconds.   Neurological:      General: No focal deficit present.      Mental Status: He is alert.   Psychiatric:         Mood and Affect: Mood normal.            XR FOOT RIGHT (MIN 3 VIEWS)  Narrative: EXAMINATION:  THREE XRAY VIEWS OF THE RIGHT FOOT    3/5/2024 4:03 pm    COMPARISON:  Right foot radiograph series 08/28/2023    HISTORY:  ORDERING SYSTEM PROVIDED HISTORY: Pain of right heel    FINDINGS:  Osseous structures of the foot appear intact and are aligned normally.  Joint  spaces appear well maintained. Soft tissues are unremarkable in appearance.  No retained radiopaque foreign body.  Impression: No acute osseous abnormality evident.          An electronic signature was used to authenticate this note.    --PING Rust - CNP

## 2024-03-25 ENCOUNTER — OFFICE VISIT (OUTPATIENT)
Dept: ORTHOPEDIC SURGERY | Age: 69
End: 2024-03-25
Payer: MEDICARE

## 2024-03-25 VITALS
SYSTOLIC BLOOD PRESSURE: 123 MMHG | DIASTOLIC BLOOD PRESSURE: 69 MMHG | BODY MASS INDEX: 22.26 KG/M2 | HEIGHT: 73 IN | HEART RATE: 61 BPM | WEIGHT: 168 LBS

## 2024-03-25 DIAGNOSIS — M72.2 PLANTAR FASCIITIS: ICD-10-CM

## 2024-03-25 DIAGNOSIS — M79.671 PAIN OF RIGHT HEEL: Primary | ICD-10-CM

## 2024-03-25 PROCEDURE — 99214 OFFICE O/P EST MOD 30 MIN: CPT | Performed by: NURSE PRACTITIONER

## 2024-03-25 RX ORDER — MELOXICAM 15 MG/1
15 TABLET ORAL DAILY
COMMUNITY

## 2024-03-25 NOTE — PROGRESS NOTES
CYSTOSCOPY      WITH BLADDER BIOPSY    ESOPHAGOGASTRODUODENOSCOPY  2018    by Dr. Trotter @ [facility]    HEMORRHOID SURGERY  2013    Thrombosed    HERNIA REPAIR Right     inguinal right with mesh; laparoscopic at [facility]    LYMPH NODE DISSECTION Left     AXILLARY- benign    OTHER SURGICAL HISTORY      PE tubes    SINUS SURGERY Bilateral 1994    TONSILLECTOMY AND ADENOIDECTOMY      TOOTH EXTRACTION  2017       FAMILY HISTORY    Family History   Problem Relation Age of Onset    Diabetes Mother     Pneumonia Mother     Diabetes Father     Colon Cancer Father 72    Stroke Maternal Grandmother     Heart Failure Paternal Grandfather        SOCIAL HISTORY    Social History     Tobacco Use    Smoking status: Former     Current packs/day: 0.00     Average packs/day: 0.5 packs/day for 2.0 years (1.0 ttl pk-yrs)     Types: Cigarettes     Start date: 1977     Quit date:      Years since quittin.2    Smokeless tobacco: Never   Vaping Use    Vaping Use: Never used   Substance Use Topics    Alcohol use: No    Drug use: No       ALLERGIES    Allergies   Allergen Reactions    Pcn [Penicillins]      Skin tested positive as child    Sulfa Antibiotics Nausea Only    Triamcinolone     Povidone Iodine Rash    Triamcinolone Hexacetonide Rash     Topical Kenalog only. Patient reports having multiple celestone injections without rash       MEDICATIONS    Current Outpatient Medications on File Prior to Visit   Medication Sig Dispense Refill    meloxicam (MOBIC) 15 MG tablet Take 1 tablet by mouth daily      ibuprofen (ADVIL;MOTRIN) 200 MG tablet Take 2 tablets by mouth every 6 hours as needed for Pain      Omega-3 Fatty Acids (FISH OIL) 1000 MG capsule Take by mouth      Multiple Vitamin (MULTI-VITAMIN DAILY PO) Take by mouth      aspirin 81 MG chewable tablet Take 1 tablet by mouth every other day      vitamin D (CHOLECALCIFEROL) 125 MCG (5000 UT) CAPS capsule Take 1 capsule by mouth daily       No

## 2024-05-13 ENCOUNTER — OFFICE VISIT (OUTPATIENT)
Dept: PRIMARY CARE CLINIC | Age: 69
End: 2024-05-13
Payer: MEDICARE

## 2024-05-13 VITALS
OXYGEN SATURATION: 98 % | RESPIRATION RATE: 16 BRPM | HEART RATE: 72 BPM | TEMPERATURE: 97.3 F | BODY MASS INDEX: 21.47 KG/M2 | DIASTOLIC BLOOD PRESSURE: 80 MMHG | WEIGHT: 162 LBS | SYSTOLIC BLOOD PRESSURE: 114 MMHG | HEIGHT: 73 IN

## 2024-05-13 DIAGNOSIS — M77.51 TENDONITIS OF ANKLE, RIGHT: Primary | ICD-10-CM

## 2024-05-13 PROCEDURE — G8420 CALC BMI NORM PARAMETERS: HCPCS | Performed by: NURSE PRACTITIONER

## 2024-05-13 PROCEDURE — 3017F COLORECTAL CA SCREEN DOC REV: CPT | Performed by: NURSE PRACTITIONER

## 2024-05-13 PROCEDURE — 99213 OFFICE O/P EST LOW 20 MIN: CPT | Performed by: NURSE PRACTITIONER

## 2024-05-13 PROCEDURE — 1123F ACP DISCUSS/DSCN MKR DOCD: CPT | Performed by: NURSE PRACTITIONER

## 2024-05-13 PROCEDURE — 1036F TOBACCO NON-USER: CPT | Performed by: NURSE PRACTITIONER

## 2024-05-13 PROCEDURE — 99212 OFFICE O/P EST SF 10 MIN: CPT | Performed by: NURSE PRACTITIONER

## 2024-05-13 PROCEDURE — G8427 DOCREV CUR MEDS BY ELIG CLIN: HCPCS | Performed by: NURSE PRACTITIONER

## 2024-05-13 RX ORDER — MELOXICAM 15 MG/1
15 TABLET ORAL PRN
Qty: 14 TABLET | Refills: 0 | Status: SHIPPED | OUTPATIENT
Start: 2024-05-13

## 2024-05-13 ASSESSMENT — ENCOUNTER SYMPTOMS
COUGH: 0
SHORTNESS OF BREATH: 0
WHEEZING: 0

## 2024-05-13 NOTE — PROGRESS NOTES
[facility]  [address] [place], [id number]  [phone]      HPI:     Ankle Pain   The incident occurred 5 to 7 days ago. The incident occurred at home. There was no injury mechanism (walking on a treadmill with hiking boots). The pain is present in the right ankle. The quality of the pain is described as aching. The pain is at a severity of 5/10. The pain is moderate. The pain has been Improving since onset. Pertinent negatives include no loss of motion, loss of sensation, numbness or tingling. He reports no foreign bodies present. The symptoms are aggravated by movement, weight bearing and palpation. He has tried nothing for the symptoms. The treatment provided no relief.       Current Outpatient Medications   Medication Sig Dispense Refill    MISC NATURAL PRODUCTS PO Take by mouth Prostate multivitamin supplement      ibuprofen (ADVIL;MOTRIN) 200 MG tablet Take 2 tablets by mouth every 6 hours as needed for Pain      Omega-3 Fatty Acids (FISH OIL) 1000 MG capsule Take by mouth      Multiple Vitamin (MULTI-VITAMIN DAILY PO) Take by mouth      aspirin 81 MG chewable tablet Take 1 tablet by mouth every other day      vitamin D (CHOLECALCIFEROL) 125 MCG (5000 UT) CAPS capsule Take 1 capsule by mouth daily      meloxicam (MOBIC) 15 MG tablet Take 1 tablet by mouth as needed for Pain 14 tablet 0     No current facility-administered medications for this visit.     Allergies   Allergen Reactions    Pcn [Penicillins]      Skin tested positive as child    Sulfa Antibiotics Nausea Only    Triamcinolone     Povidone Iodine Rash    Triamcinolone Hexacetonide Rash     Topical Kenalog only. Patient reports having multiple celestone injections without rash       All patients pastmedical, surgical, social and family history has been reviewed.    Subjective:      Review of Systems   Constitutional:  Negative for activity change, appetite change, fatigue and fever.   Respiratory:  Negative

## 2024-06-24 DIAGNOSIS — M79.672 LEFT FOOT PAIN: Primary | ICD-10-CM

## 2024-07-01 ENCOUNTER — HOSPITAL ENCOUNTER (OUTPATIENT)
Dept: GENERAL RADIOLOGY | Age: 69
Discharge: HOME | End: 2024-07-03
Payer: MEDICARE

## 2024-07-01 ENCOUNTER — OFFICE VISIT (OUTPATIENT)
Dept: ORTHOPEDIC SURGERY | Age: 69
End: 2024-07-01
Payer: MEDICARE

## 2024-07-01 VITALS
SYSTOLIC BLOOD PRESSURE: 126 MMHG | HEART RATE: 62 BPM | DIASTOLIC BLOOD PRESSURE: 70 MMHG | HEIGHT: 74 IN | BODY MASS INDEX: 20.79 KG/M2 | WEIGHT: 162 LBS | OXYGEN SATURATION: 98 %

## 2024-07-01 DIAGNOSIS — M79.672 BILATERAL FOOT PAIN: Primary | ICD-10-CM

## 2024-07-01 DIAGNOSIS — M79.671 BILATERAL FOOT PAIN: Primary | ICD-10-CM

## 2024-07-01 DIAGNOSIS — M79.672 LEFT FOOT PAIN: ICD-10-CM

## 2024-07-01 PROCEDURE — 1036F TOBACCO NON-USER: CPT | Performed by: PODIATRIST

## 2024-07-01 PROCEDURE — 73630 X-RAY EXAM OF FOOT: CPT

## 2024-07-01 PROCEDURE — 1123F ACP DISCUSS/DSCN MKR DOCD: CPT | Performed by: PODIATRIST

## 2024-07-01 PROCEDURE — G8427 DOCREV CUR MEDS BY ELIG CLIN: HCPCS | Performed by: PODIATRIST

## 2024-07-01 PROCEDURE — G8420 CALC BMI NORM PARAMETERS: HCPCS | Performed by: PODIATRIST

## 2024-07-01 PROCEDURE — 99213 OFFICE O/P EST LOW 20 MIN: CPT | Performed by: PODIATRIST

## 2024-07-01 PROCEDURE — 99211 OFF/OP EST MAY X REQ PHY/QHP: CPT | Performed by: PODIATRIST

## 2024-07-01 PROCEDURE — 3017F COLORECTAL CA SCREEN DOC REV: CPT | Performed by: PODIATRIST

## 2024-07-01 NOTE — PROGRESS NOTES
[facility]         Progress and Procedure Note      Manish Roblero  MEDICAL RECORD NUMBER:  [id number]  AGE: 68 y.o.   GENDER: male  : 1955  EPISODE DATE:  2024    Subjective:     Chief Complaint   Patient presents with    Pain     [place] foot pain          HISTORY of PRESENT ILLNESS HPI    24   Patient is a pleasant 68-year-old male who is following up for bilateral foot ankle pain.  Patient was most recently seen by nurse practitioner Dawood Betancur back on 3/25/2024.  Patient is currently ambulating and on cloud style tennis shoes with a  orthotic obtained from his chiropractor.  Patient has tried silicone heel cups in the past.  Patient states that he had new onset posterior tibial tendinitis/posterior shinsplints to the right leg induced from treadmill ambulating and cane style boots.  Patient is currently in physical therapy states the majority of the symptoms have resolved.  Patient's main complaint today is tight gastrosoleus complex bilateral as well as bunion deformities bilateral.  We discussed conservative or surgical remedies patient was given handout for silicone toe spacer as well as a toe alignment splint at bedtime.  Patient was also provided a stretching handout will follow-up in 3 months.  All questions concerns regarding medical management were otherwise addressed.        PAST MEDICAL HISTORY        Diagnosis Date    Acquired deformity of toe     Chronic prostatitis     Disorder of bursae and tendons in shoulder region     GERD with esophagitis     Hoarseness     Mixed hyperlipidemia     Primary osteoarthritis of wrist     Reflux esophagitis     Thrombosed hemorrhoids        PAST SURGICAL HISTORY    Past Surgical History:   Procedure Laterality Date    COLONOSCOPY  2018    [facility]    COLONOSCOPY      normal    COLONOSCOPY      CYSTOSCOPY      WITH BLADDER BIOPSY    ESOPHAGOGASTRODUODENOSCOPY  2018

## 2024-09-09 DIAGNOSIS — M72.2 PLANTAR FASCIITIS: Primary | ICD-10-CM

## 2024-09-09 DIAGNOSIS — M79.671 PAIN OF RIGHT HEEL: ICD-10-CM

## 2024-09-13 ENCOUNTER — TELEPHONE (OUTPATIENT)
Dept: SURGERY | Age: 69
End: 2024-09-13

## 2024-09-16 ENCOUNTER — HOSPITAL ENCOUNTER (OUTPATIENT)
Dept: GENERAL RADIOLOGY | Age: 69
Discharge: HOME | End: 2024-09-18
Payer: MEDICARE

## 2024-09-16 ENCOUNTER — OFFICE VISIT (OUTPATIENT)
Dept: ORTHOPEDIC SURGERY | Age: 69
End: 2024-09-16
Payer: MEDICARE

## 2024-09-16 ENCOUNTER — HOSPITAL ENCOUNTER (OUTPATIENT)
Age: 69
Discharge: HOME | End: 2024-09-16
Payer: MEDICARE

## 2024-09-16 VITALS
HEIGHT: 74 IN | BODY MASS INDEX: 20.79 KG/M2 | SYSTOLIC BLOOD PRESSURE: 126 MMHG | DIASTOLIC BLOOD PRESSURE: 67 MMHG | HEART RATE: 79 BPM | WEIGHT: 162 LBS

## 2024-09-16 DIAGNOSIS — M79.672 BILATERAL FOOT PAIN: Primary | ICD-10-CM

## 2024-09-16 DIAGNOSIS — M79.671 PAIN OF RIGHT HEEL: ICD-10-CM

## 2024-09-16 DIAGNOSIS — M79.671 BILATERAL FOOT PAIN: Primary | ICD-10-CM

## 2024-09-16 DIAGNOSIS — M72.2 PLANTAR FASCIITIS: ICD-10-CM

## 2024-09-16 LAB
ALBUMIN SERPL-MCNC: 4.5 G/DL (ref 3.5–5.2)
ALBUMIN/GLOB SERPL: 1.9 {RATIO} (ref 1–2.5)
ALP SERPL-CCNC: 64 U/L (ref 40–129)
ALT SERPL-CCNC: 19 U/L (ref 5–41)
ANION GAP SERPL CALCULATED.3IONS-SCNC: 9 MMOL/L (ref 9–17)
AST SERPL-CCNC: 18 U/L
BILIRUB SERPL-MCNC: 0.9 MG/DL (ref 0.3–1.2)
BUN SERPL-MCNC: 14 MG/DL (ref 8–23)
BUN/CREAT SERPL: 18 (ref 9–20)
CALCIUM SERPL-MCNC: 9.3 MG/DL (ref 8.6–10.4)
CHLORIDE SERPL-SCNC: 96 MMOL/L (ref 98–107)
CHOLEST SERPL-MCNC: 230 MG/DL (ref 0–199)
CHOLESTEROL/HDL RATIO: 5
CO2 SERPL-SCNC: 29 MMOL/L (ref 20–31)
CREAT SERPL-MCNC: 0.8 MG/DL (ref 0.7–1.2)
GFR, ESTIMATED: >90 ML/MIN/1.73M2
GLUCOSE SERPL-MCNC: 97 MG/DL (ref 70–99)
HDLC SERPL-MCNC: 48 MG/DL
LDLC SERPL CALC-MCNC: 158 MG/DL (ref 0–100)
POTASSIUM SERPL-SCNC: 4.4 MMOL/L (ref 3.7–5.3)
PROT SERPL-MCNC: 6.9 G/DL (ref 6.4–8.3)
PSA SERPL-MCNC: 2.2 NG/ML (ref 0–4)
SODIUM SERPL-SCNC: 134 MMOL/L (ref 135–144)
TRIGL SERPL-MCNC: 123 MG/DL
VLDLC SERPL CALC-MCNC: 25 MG/DL

## 2024-09-16 PROCEDURE — 1123F ACP DISCUSS/DSCN MKR DOCD: CPT | Performed by: PODIATRIST

## 2024-09-16 PROCEDURE — 3017F COLORECTAL CA SCREEN DOC REV: CPT | Performed by: PODIATRIST

## 2024-09-16 PROCEDURE — 36415 COLL VENOUS BLD VENIPUNCTURE: CPT

## 2024-09-16 PROCEDURE — 73630 X-RAY EXAM OF FOOT: CPT

## 2024-09-16 PROCEDURE — 80061 LIPID PANEL: CPT

## 2024-09-16 PROCEDURE — 99214 OFFICE O/P EST MOD 30 MIN: CPT | Performed by: PODIATRIST

## 2024-09-16 PROCEDURE — G8427 DOCREV CUR MEDS BY ELIG CLIN: HCPCS | Performed by: PODIATRIST

## 2024-09-16 PROCEDURE — G8420 CALC BMI NORM PARAMETERS: HCPCS | Performed by: PODIATRIST

## 2024-09-16 PROCEDURE — 99213 OFFICE O/P EST LOW 20 MIN: CPT | Performed by: PODIATRIST

## 2024-09-16 PROCEDURE — G0103 PSA SCREENING: HCPCS

## 2024-09-16 PROCEDURE — 80053 COMPREHEN METABOLIC PANEL: CPT

## 2024-09-16 PROCEDURE — 1036F TOBACCO NON-USER: CPT | Performed by: PODIATRIST

## 2024-10-21 ENCOUNTER — OFFICE VISIT (OUTPATIENT)
Dept: ORTHOPEDIC SURGERY | Age: 69
End: 2024-10-21
Payer: MEDICARE

## 2024-10-21 VITALS
WEIGHT: 162 LBS | SYSTOLIC BLOOD PRESSURE: 122 MMHG | DIASTOLIC BLOOD PRESSURE: 80 MMHG | HEIGHT: 74 IN | BODY MASS INDEX: 20.79 KG/M2

## 2024-10-21 DIAGNOSIS — M79.671 PAIN OF RIGHT HEEL: ICD-10-CM

## 2024-10-21 DIAGNOSIS — M79.672 BILATERAL FOOT PAIN: Primary | ICD-10-CM

## 2024-10-21 DIAGNOSIS — M72.2 PLANTAR FASCIITIS: ICD-10-CM

## 2024-10-21 DIAGNOSIS — M20.5X2 HALLUX LIMITUS, ACQUIRED, LEFT: ICD-10-CM

## 2024-10-21 DIAGNOSIS — M79.671 BILATERAL FOOT PAIN: Primary | ICD-10-CM

## 2024-10-21 PROCEDURE — 99212 OFFICE O/P EST SF 10 MIN: CPT | Performed by: PODIATRIST

## 2024-10-21 RX ORDER — PANTOPRAZOLE SODIUM 40 MG/1
40 TABLET, DELAYED RELEASE ORAL DAILY
COMMUNITY
Start: 2024-07-29

## 2024-11-01 ENCOUNTER — OFFICE VISIT (OUTPATIENT)
Dept: PRIMARY CARE CLINIC | Age: 69
End: 2024-11-01

## 2024-11-01 VITALS
SYSTOLIC BLOOD PRESSURE: 128 MMHG | WEIGHT: 162 LBS | DIASTOLIC BLOOD PRESSURE: 72 MMHG | OXYGEN SATURATION: 98 % | BODY MASS INDEX: 20.8 KG/M2 | HEART RATE: 96 BPM | RESPIRATION RATE: 20 BRPM | TEMPERATURE: 97.3 F

## 2024-11-01 DIAGNOSIS — R68.83 CHILLS: Primary | ICD-10-CM

## 2024-11-02 NOTE — PROGRESS NOTES
[facility]             [address] [address]                        Telephone [phone]             Fax [phone]       Manish Roblero  :  1955  Age:  69 y.o.   MRN:  [id number]  Date of visit:  2024       Assessment & Plan:    Chills  Viral prodrome vs. anxiety vs. other  I reviewed the results of testing done today with the patient.    I recommended that he drink adequate fluids, eat regular meals, and get additional rest.  I recommend that he return or go the the emergency department if his symptoms worsen.              Subjective:    Manish Roblero is a 69 y.o. male who presents to [facility] today (2024) for evaluation of:  Chills (Chills started last night. No cough, no fever. Pt feels jittery. )      He reports that he developed chills yesterday evening.  He reports that his symptoms have improved, but he continues to feel \"jittery\" intermittently today.  He denies cough or body aches.   He has checked his temperature at home, and it was not elevated.  He reports some symptoms of anxiety today.   He has not had issues of anxiety in the past.  He denies chest pain, shortness of breath, or palpitations.         Current medications are:  Current Outpatient Medications   Medication Sig Dispense Refill    pantoprazole (PROTONIX) 40 MG tablet Take 1 tablet by mouth daily      MISC NATURAL PRODUCTS PO Take by mouth Prostate multivitamin supplement      meloxicam (MOBIC) 15 MG tablet Take 1 tablet by mouth as needed for Pain 14 tablet 0    ibuprofen (ADVIL;MOTRIN) 200 MG tablet Take 2 tablets by mouth every 6 hours as needed for Pain      Omega-3 Fatty Acids (FISH OIL) 1000 MG capsule Take by mouth      Multiple Vitamin (MULTI-VITAMIN DAILY PO) Take by mouth      aspirin 81 MG chewable tablet Take 1 tablet by mouth every other day      vitamin D (CHOLECALCIFEROL) 125 MCG (5000 UT) CAPS capsule Take 1

## 2024-11-18 ENCOUNTER — OFFICE VISIT (OUTPATIENT)
Dept: ORTHOPEDIC SURGERY | Age: 69
End: 2024-11-18
Payer: MEDICARE

## 2024-11-18 VITALS
WEIGHT: 162 LBS | BODY MASS INDEX: 20.79 KG/M2 | DIASTOLIC BLOOD PRESSURE: 62 MMHG | SYSTOLIC BLOOD PRESSURE: 118 MMHG | HEIGHT: 74 IN

## 2024-11-18 DIAGNOSIS — M72.2 PLANTAR FASCIITIS: Primary | ICD-10-CM

## 2024-11-18 DIAGNOSIS — M79.672 BILATERAL FOOT PAIN: ICD-10-CM

## 2024-11-18 DIAGNOSIS — M20.5X2 HALLUX LIMITUS, ACQUIRED, LEFT: ICD-10-CM

## 2024-11-18 DIAGNOSIS — M79.671 PAIN OF RIGHT HEEL: ICD-10-CM

## 2024-11-18 DIAGNOSIS — M79.671 BILATERAL FOOT PAIN: ICD-10-CM

## 2024-11-18 PROCEDURE — 1123F ACP DISCUSS/DSCN MKR DOCD: CPT | Performed by: PODIATRIST

## 2024-11-18 PROCEDURE — 20605 DRAIN/INJ JOINT/BURSA W/O US: CPT | Performed by: PODIATRIST

## 2024-11-18 PROCEDURE — 99212 OFFICE O/P EST SF 10 MIN: CPT | Performed by: PODIATRIST

## 2024-11-18 PROCEDURE — G8420 CALC BMI NORM PARAMETERS: HCPCS | Performed by: PODIATRIST

## 2024-11-18 PROCEDURE — 1036F TOBACCO NON-USER: CPT | Performed by: PODIATRIST

## 2024-11-18 PROCEDURE — G8427 DOCREV CUR MEDS BY ELIG CLIN: HCPCS | Performed by: PODIATRIST

## 2024-11-18 PROCEDURE — 1159F MED LIST DOCD IN RCRD: CPT | Performed by: PODIATRIST

## 2024-11-18 PROCEDURE — 99213 OFFICE O/P EST LOW 20 MIN: CPT | Performed by: PODIATRIST

## 2024-11-18 PROCEDURE — 3017F COLORECTAL CA SCREEN DOC REV: CPT | Performed by: PODIATRIST

## 2024-11-18 PROCEDURE — G8484 FLU IMMUNIZE NO ADMIN: HCPCS | Performed by: PODIATRIST

## 2024-11-18 RX ORDER — METHYLPREDNISOLONE ACETATE 40 MG/ML
40 INJECTION, SUSPENSION INTRA-ARTICULAR; INTRALESIONAL; INTRAMUSCULAR; SOFT TISSUE ONCE
Status: COMPLETED | OUTPATIENT
Start: 2024-11-18 | End: 2024-11-19

## 2024-11-18 RX ORDER — DEXAMETHASONE SODIUM PHOSPHATE 4 MG/ML
4 INJECTION, SOLUTION INTRA-ARTICULAR; INTRALESIONAL; INTRAMUSCULAR; INTRAVENOUS; SOFT TISSUE ONCE
Status: COMPLETED | OUTPATIENT
Start: 2024-11-18 | End: 2024-11-19

## 2024-11-18 RX ORDER — BUPIVACAINE HYDROCHLORIDE 5 MG/ML
1 INJECTION, SOLUTION PERINEURAL ONCE
Status: COMPLETED | OUTPATIENT
Start: 2024-11-18 | End: 2024-11-19

## 2024-11-18 RX ORDER — BUPROPION HYDROCHLORIDE 150 MG/1
150 TABLET ORAL DAILY
COMMUNITY
Start: 2024-11-14 | End: 2025-01-13

## 2024-11-18 NOTE — PATIENT INSTRUCTIONS
CREATININE 0.8 09/16/2024 11:33 AM     PT/INR: No results found for: \"PROTIME\", \"INR\"  Prealbumin: No results found for: \"PREALBUMIN\"  Albumin:  No results found for: \"LABALBU\"    Sed Rate:No results found for: \"SEDRATE\"  CRP: No results found for: \"CRP\"  Micro: No results found for: \"BC\"   Hemoglobin A1C: No results found for: \"LABA1C\"    Assessment:      Diagnosis Orders   1. Plantar fasciitis        2. Pain of right heel        3. Hallux limitus, acquired, left        4. Bilateral foot pain              Procedure Note  20550, right plantar fasciitis corticosteroid injection  Under sterile conditions the skin was prepped using alcohol pad followed by use of ethyl chloride spray.  A 3 cc steroid cocktail consisting of 1 cc half percent bupivacaine plain 1 cc 80 mg/cc Depo-Medrol mixed with 1 cc 4 mg/cc dexamethasone was injected in the medial plantar fascial band under sterile conditions site was dressed with a Band-Aid.    Plan:     Patient examined and evaluated  Patient will continue with homeopathic remedies at home including Epsom salt soaks topical Voltaren gel as well as intermittent digital silicone toe spacer  Patient will follow-up in 6 weeks  All questions concerns regarding medical management were otherwise addressed    Jermaine Schuler DPM   [facility]   Electronically signed by Jermaine Schuler DPM on 11/18/2024 at 5:15 PM      No orders of the defined types were placed in this encounter.

## 2024-11-18 NOTE — PROGRESS NOTES
[facility]         Progress and Procedure Note        Manish Roblero  MEDICAL RECORD NUMBER:  [id number]  AGE: 69 y.o.   GENDER: male  : 1955  EPISODE DATE:  24      Subjective:           Chief Complaint   Patient presents with    Toe Pain        having issues on left foot under little toe               HISTORY of PRESENT ILLNESS HPI     Patient is a pleasant 69-year-old male who is following up for history of bilateral foot pain particularly plantar fasciitis coupled with hallux limitus.  Patient in the interval obtained a new Altra style tennis shoe and is continuing to use silicone toe spacers.  Patient is encouraged to continue with Epsom salt soaks at home as well as topical Voltaren gel.  Patient continues to have some mild left fifth metatarsophalangeal joint bursitis.  Will follow-up with us in 6 weeks.  All questions concerns regarding medical management were otherwise addressed.        PAST MEDICAL HISTORY     Past Medical History            Diagnosis Date    Acquired deformity of toe      Chronic prostatitis      Disorder of bursae and tendons in shoulder region      GERD with esophagitis      Hoarseness      Mixed hyperlipidemia      Primary osteoarthritis of wrist      Reflux esophagitis      Thrombosed hemorrhoids              PAST SURGICAL HISTORY     Past Surgical History         Past Surgical History:   Procedure Laterality Date    COLONOSCOPY   2018     Tortuous sigmoid colon By Dr. Santos @ [facility]    COLONOSCOPY        normal    COLONOSCOPY       CYSTOSCOPY         WITH BLADDER BIOPSY    ESOPHAGOGASTRODUODENOSCOPY   2018     by Dr. Trotter @ [facility]    HEMORRHOID SURGERY   2013     Thrombosed    HERNIA REPAIR Right      inguinal right with mesh; laparoscopic at [facility]    LYMPH NODE DISSECTION Left       AXILLARY- benign    OTHER SURGICAL HISTORY         PE tubes    SINUS SURGERY Bilateral 1994    TONSILLECTOMY AND ADENOIDECTOMY

## 2024-11-19 PROCEDURE — PBSHW PBB SHADOW CHARGE: Performed by: PODIATRIST

## 2024-11-19 RX ADMIN — DEXAMETHASONE SODIUM PHOSPHATE 4 MG: 4 INJECTION INTRA-ARTICULAR; INTRALESIONAL; INTRAMUSCULAR; INTRAVENOUS; SOFT TISSUE at 07:20

## 2024-11-19 RX ADMIN — METHYLPREDNISOLONE ACETATE 40 MG: 40 INJECTION, SUSPENSION INTRA-ARTICULAR; INTRALESIONAL; INTRAMUSCULAR; INTRASYNOVIAL; SOFT TISSUE at 07:21

## 2024-11-19 RX ADMIN — BUPIVACAINE HYDROCHLORIDE 5 MG: 5 INJECTION, SOLUTION PERINEURAL at 07:20

## 2024-12-18 ENCOUNTER — HOSPITAL ENCOUNTER (OUTPATIENT)
Age: 69
Discharge: HOME | End: 2024-12-18
Payer: MEDICARE

## 2024-12-18 LAB
BACTERIA URNS QL MICRO: ABNORMAL
BILIRUB UR QL STRIP: NEGATIVE
CHARACTER UR: ABNORMAL
CLARITY UR: CLEAR
COLOR UR: YELLOW
EPI CELLS #/AREA URNS HPF: ABNORMAL /HPF (ref 0–5)
GLUCOSE UR STRIP-MCNC: NEGATIVE MG/DL
HGB UR QL STRIP.AUTO: ABNORMAL
KETONES UR STRIP-MCNC: NEGATIVE MG/DL
LEUKOCYTE ESTERASE UR QL STRIP: NEGATIVE
NITRITE UR QL STRIP: NEGATIVE
PH UR STRIP: 7.5 [PH] (ref 5–6)
PROT UR STRIP-MCNC: NEGATIVE MG/DL
RBC #/AREA URNS HPF: ABNORMAL /HPF (ref 0–4)
SP GR UR STRIP: 1.01 (ref 1.01–1.02)
UROBILINOGEN UR STRIP-ACNC: NORMAL EU/DL (ref 0–1)
WBC #/AREA URNS HPF: ABNORMAL /HPF (ref 0–4)

## 2024-12-18 PROCEDURE — 81001 URINALYSIS AUTO W/SCOPE: CPT

## 2024-12-30 ENCOUNTER — HOSPITAL ENCOUNTER (EMERGENCY)
Age: 69
Discharge: HOME | End: 2024-12-30
Attending: EMERGENCY MEDICINE
Payer: MEDICARE

## 2024-12-30 VITALS
HEIGHT: 73 IN | DIASTOLIC BLOOD PRESSURE: 72 MMHG | OXYGEN SATURATION: 99 % | RESPIRATION RATE: 16 BRPM | BODY MASS INDEX: 20.54 KG/M2 | WEIGHT: 155 LBS | SYSTOLIC BLOOD PRESSURE: 132 MMHG | TEMPERATURE: 98.1 F | HEART RATE: 70 BPM

## 2024-12-30 DIAGNOSIS — R29.898 WEAKNESS OF BOTH LOWER EXTREMITIES: Primary | ICD-10-CM

## 2024-12-30 LAB
ANION GAP SERPL CALCULATED.3IONS-SCNC: 12 MMOL/L (ref 9–17)
BACTERIA URNS QL MICRO: ABNORMAL
BASOPHILS # BLD: 0.05 K/UL (ref 0–0.2)
BASOPHILS NFR BLD: 1 % (ref 0–2)
BILIRUB UR QL STRIP: NEGATIVE
BUN SERPL-MCNC: 17 MG/DL (ref 8–23)
BUN/CREAT SERPL: 19 (ref 9–20)
CALCIUM SERPL-MCNC: 9.1 MG/DL (ref 8.6–10.4)
CHARACTER UR: ABNORMAL
CHLORIDE SERPL-SCNC: 93 MMOL/L (ref 98–107)
CK SERPL-CCNC: 174 U/L (ref 39–308)
CLARITY UR: CLEAR
CO2 SERPL-SCNC: 25 MMOL/L (ref 20–31)
COLOR UR: YELLOW
CREAT SERPL-MCNC: 0.9 MG/DL (ref 0.7–1.2)
EKG ATRIAL RATE: 68 BPM
EKG P AXIS: 84 DEGREES
EKG P-R INTERVAL: 220 MS
EKG Q-T INTERVAL: 386 MS
EKG QRS DURATION: 92 MS
EKG QTC CALCULATION (BAZETT): 410 MS
EKG R AXIS: 48 DEGREES
EKG T AXIS: 69 DEGREES
EKG VENTRICULAR RATE: 68 BPM
EOSINOPHIL # BLD: 0.16 K/UL (ref 0–0.44)
EOSINOPHILS RELATIVE PERCENT: 2 % (ref 1–4)
EPI CELLS #/AREA URNS HPF: ABNORMAL /HPF (ref 0–5)
ERYTHROCYTE [DISTWIDTH] IN BLOOD BY AUTOMATED COUNT: 11.7 % (ref 11.8–14.4)
GFR, ESTIMATED: >90 ML/MIN/1.73M2
GLUCOSE SERPL-MCNC: 106 MG/DL (ref 70–99)
GLUCOSE UR STRIP-MCNC: NEGATIVE MG/DL
HCT VFR BLD AUTO: 37.5 % (ref 40.7–50.3)
HGB BLD-MCNC: 13.2 G/DL (ref 13–17)
HGB UR QL STRIP.AUTO: ABNORMAL
IMM GRANULOCYTES # BLD AUTO: <0.03 K/UL (ref 0–0.3)
IMM GRANULOCYTES NFR BLD: 0 %
KETONES UR STRIP-MCNC: ABNORMAL MG/DL
LEUKOCYTE ESTERASE UR QL STRIP: NEGATIVE
LYMPHOCYTES NFR BLD: 1.56 K/UL (ref 1.1–3.7)
LYMPHOCYTES RELATIVE PERCENT: 23 % (ref 24–43)
MCH RBC QN AUTO: 31.1 PG (ref 25.2–33.5)
MCHC RBC AUTO-ENTMCNC: 35.2 G/DL (ref 25.2–33.5)
MCV RBC AUTO: 88.4 FL (ref 82.6–102.9)
MONOCYTES NFR BLD: 0.59 K/UL (ref 0.1–1.2)
MONOCYTES NFR BLD: 9 % (ref 3–12)
MYOGLOBIN SERPL-MCNC: 52 NG/ML (ref 28–72)
NEUTROPHILS NFR BLD: 65 % (ref 36–65)
NEUTS SEG NFR BLD: 4.51 K/UL (ref 1.5–8.1)
NITRITE UR QL STRIP: NEGATIVE
NRBC BLD-RTO: 0 PER 100 WBC
PH UR STRIP: 7 [PH] (ref 5–6)
PLATELET # BLD AUTO: 322 K/UL (ref 138–453)
PMV BLD AUTO: 8.1 FL (ref 8.1–13.5)
POTASSIUM SERPL-SCNC: 4 MMOL/L (ref 3.7–5.3)
PROT UR STRIP-MCNC: NEGATIVE MG/DL
RBC # BLD AUTO: 4.24 M/UL (ref 4.21–5.77)
RBC #/AREA URNS HPF: ABNORMAL /HPF (ref 0–4)
SODIUM SERPL-SCNC: 130 MMOL/L (ref 135–144)
SP GR UR STRIP: 1.02 (ref 1.01–1.02)
TROPONIN I SERPL HS-MCNC: 9 NG/L (ref 0–22)
UROBILINOGEN UR STRIP-ACNC: NORMAL EU/DL (ref 0–1)
WBC #/AREA URNS HPF: ABNORMAL /HPF (ref 0–4)
WBC OTHER # BLD: 6.9 K/UL (ref 3.5–11.3)

## 2024-12-30 PROCEDURE — 84484 ASSAY OF TROPONIN QUANT: CPT

## 2024-12-30 PROCEDURE — 80048 BASIC METABOLIC PNL TOTAL CA: CPT

## 2024-12-30 PROCEDURE — 85025 COMPLETE CBC W/AUTO DIFF WBC: CPT

## 2024-12-30 PROCEDURE — 81001 URINALYSIS AUTO W/SCOPE: CPT

## 2024-12-30 PROCEDURE — 82550 ASSAY OF CK (CPK): CPT

## 2024-12-30 PROCEDURE — 36415 COLL VENOUS BLD VENIPUNCTURE: CPT

## 2024-12-30 PROCEDURE — 93005 ELECTROCARDIOGRAM TRACING: CPT | Performed by: EMERGENCY MEDICINE

## 2024-12-30 PROCEDURE — 83874 ASSAY OF MYOGLOBIN: CPT

## 2024-12-30 PROCEDURE — 99284 EMERGENCY DEPT VISIT MOD MDM: CPT

## 2024-12-30 ASSESSMENT — LIFESTYLE VARIABLES
HOW MANY STANDARD DRINKS CONTAINING ALCOHOL DO YOU HAVE ON A TYPICAL DAY: PATIENT DOES NOT DRINK
HOW OFTEN DO YOU HAVE A DRINK CONTAINING ALCOHOL: NEVER

## 2024-12-30 ASSESSMENT — PAIN - FUNCTIONAL ASSESSMENT
PAIN_FUNCTIONAL_ASSESSMENT: NONE - DENIES PAIN

## 2024-12-30 NOTE — DISCHARGE INSTRUCTIONS
Return immediately if any worsening symptoms or any other concerns    Please understand that early in the process of an illness or injury, an emergency department workup can be falsely reassuring.      Tell us how we did visit: [email or web address]   and let us know about your experience

## 2024-12-31 ENCOUNTER — TELEPHONE (OUTPATIENT)
Dept: ORTHOPEDIC SURGERY | Age: 69
End: 2024-12-31

## 2024-12-31 DIAGNOSIS — M79.671 PAIN OF RIGHT HEEL: ICD-10-CM

## 2024-12-31 DIAGNOSIS — M72.2 PLANTAR FASCIITIS: Primary | ICD-10-CM

## 2024-12-31 DIAGNOSIS — M79.672 BILATERAL FOOT PAIN: ICD-10-CM

## 2024-12-31 DIAGNOSIS — M79.671 BILATERAL FOOT PAIN: ICD-10-CM

## 2024-12-31 RX ORDER — PREDNISONE 20 MG/1
20 TABLET ORAL DAILY
Qty: 10 TABLET | Refills: 0 | Status: ON HOLD | OUTPATIENT
Start: 2024-12-31 | End: 2025-01-10

## 2025-01-04 ENCOUNTER — APPOINTMENT (OUTPATIENT)
Dept: CT IMAGING | Age: 70
End: 2025-01-04
Payer: MEDICARE

## 2025-01-04 ENCOUNTER — HOSPITAL ENCOUNTER (INPATIENT)
Age: 70
LOS: 5 days | Discharge: HOME HEALTH | DRG: 645 | End: 2025-01-09
Attending: INTERNAL MEDICINE | Admitting: FAMILY MEDICINE
Payer: MEDICARE

## 2025-01-04 ENCOUNTER — APPOINTMENT (OUTPATIENT)
Dept: GENERAL RADIOLOGY | Age: 70
End: 2025-01-04
Payer: MEDICARE

## 2025-01-04 ENCOUNTER — HOSPITAL ENCOUNTER (EMERGENCY)
Age: 70
Discharge: ANOTHER HOSPITAL | End: 2025-01-04
Attending: EMERGENCY MEDICINE
Payer: MEDICARE

## 2025-01-04 VITALS
OXYGEN SATURATION: 100 % | SYSTOLIC BLOOD PRESSURE: 147 MMHG | DIASTOLIC BLOOD PRESSURE: 91 MMHG | HEART RATE: 88 BPM | TEMPERATURE: 98.1 F | BODY MASS INDEX: 20.05 KG/M2 | RESPIRATION RATE: 21 BRPM | WEIGHT: 152 LBS

## 2025-01-04 DIAGNOSIS — E87.1 HYPONATREMIA: Primary | ICD-10-CM

## 2025-01-04 DIAGNOSIS — R53.1 GENERALIZED WEAKNESS: ICD-10-CM

## 2025-01-04 PROBLEM — E80.6 HYPERBILIRUBINEMIA: Status: ACTIVE | Noted: 2025-01-04

## 2025-01-04 PROBLEM — N30.10 INTERSTITIAL CYSTITIS: Status: ACTIVE | Noted: 2025-01-04

## 2025-01-04 LAB
ALBUMIN SERPL-MCNC: 4.3 G/DL (ref 3.5–5.2)
ALBUMIN SERPL-MCNC: 4.8 G/DL (ref 3.5–5.2)
ALBUMIN/GLOB SERPL: 2 {RATIO} (ref 1–2.5)
ALBUMIN/GLOB SERPL: 2.1 {RATIO} (ref 1–2.5)
ALP SERPL-CCNC: 64 U/L (ref 40–129)
ALP SERPL-CCNC: 69 U/L (ref 40–129)
ALT SERPL-CCNC: 22 U/L (ref 5–41)
ALT SERPL-CCNC: 24 U/L (ref 10–50)
ANION GAP SERPL CALCULATED.3IONS-SCNC: 12 MMOL/L (ref 9–16)
ANION GAP SERPL CALCULATED.3IONS-SCNC: 12 MMOL/L (ref 9–16)
ANION GAP SERPL CALCULATED.3IONS-SCNC: 14 MMOL/L (ref 9–17)
AST SERPL-CCNC: 26 U/L (ref 10–50)
AST SERPL-CCNC: 32 U/L
BACTERIA URNS QL MICRO: ABNORMAL
BASOPHILS # BLD: <0.03 K/UL (ref 0–0.2)
BASOPHILS NFR BLD: 0 % (ref 0–2)
BILIRUB DIRECT SERPL-MCNC: 0.5 MG/DL (ref 0–0.2)
BILIRUB INDIRECT SERPL-MCNC: 0.8 MG/DL
BILIRUB SERPL-MCNC: 1.3 MG/DL (ref 0–1.2)
BILIRUB SERPL-MCNC: 1.7 MG/DL (ref 0.3–1.2)
BILIRUB UR QL STRIP: NEGATIVE
BUN SERPL-MCNC: 15 MG/DL (ref 8–23)
BUN SERPL-MCNC: 15 MG/DL (ref 8–23)
BUN/CREAT SERPL: 19 (ref 9–20)
CALCIUM SERPL-MCNC: 9 MG/DL (ref 8.8–10.2)
CALCIUM SERPL-MCNC: 9.4 MG/DL (ref 8.6–10.4)
CHARACTER UR: ABNORMAL
CHLORIDE SERPL-SCNC: 82 MMOL/L (ref 98–107)
CHLORIDE SERPL-SCNC: 86 MMOL/L (ref 98–107)
CHLORIDE SERPL-SCNC: 89 MMOL/L (ref 98–107)
CLARITY UR: CLEAR
CO2 SERPL-SCNC: 22 MMOL/L (ref 20–31)
CO2 SERPL-SCNC: 23 MMOL/L (ref 20–31)
CO2 SERPL-SCNC: 24 MMOL/L (ref 20–31)
COLOR UR: YELLOW
CORTIS SERPL-MCNC: 32.9 UG/DL (ref 2.5–19.5)
CORTISOL COLLECTION INFO: ABNORMAL
CREAT SERPL-MCNC: 0.8 MG/DL (ref 0.7–1.2)
CREAT SERPL-MCNC: 0.8 MG/DL (ref 0.7–1.2)
EKG ATRIAL RATE: 78 BPM
EKG P AXIS: 74 DEGREES
EKG P-R INTERVAL: 224 MS
EKG Q-T INTERVAL: 378 MS
EKG QRS DURATION: 86 MS
EKG QTC CALCULATION (BAZETT): 430 MS
EKG R AXIS: 26 DEGREES
EKG T AXIS: 58 DEGREES
EKG VENTRICULAR RATE: 78 BPM
EOSINOPHIL # BLD: 0.07 K/UL (ref 0–0.44)
EOSINOPHILS RELATIVE PERCENT: 1 % (ref 1–4)
EPI CELLS #/AREA URNS HPF: ABNORMAL /HPF (ref 0–5)
ERYTHROCYTE [DISTWIDTH] IN BLOOD BY AUTOMATED COUNT: 11.4 % (ref 11.8–14.4)
ERYTHROCYTE [SEDIMENTATION RATE] IN BLOOD BY PHOTOMETRIC METHOD: 2 MM/HR (ref 0–20)
GFR, ESTIMATED: >90 ML/MIN/1.73M2
GFR, ESTIMATED: >90 ML/MIN/1.73M2
GLUCOSE SERPL-MCNC: 100 MG/DL (ref 82–115)
GLUCOSE SERPL-MCNC: 109 MG/DL (ref 70–99)
GLUCOSE UR STRIP-MCNC: NEGATIVE MG/DL
HCT VFR BLD AUTO: 36.2 % (ref 40.7–50.3)
HGB BLD-MCNC: 13.5 G/DL (ref 13–17)
HGB UR QL STRIP.AUTO: ABNORMAL
IMM GRANULOCYTES # BLD AUTO: <0.03 K/UL (ref 0–0.3)
IMM GRANULOCYTES NFR BLD: 0 %
KETONES UR STRIP-MCNC: ABNORMAL MG/DL
LEUKOCYTE ESTERASE UR QL STRIP: NEGATIVE
LYMPHOCYTES NFR BLD: 1.22 K/UL (ref 1.1–3.7)
LYMPHOCYTES RELATIVE PERCENT: 17 % (ref 24–43)
MAGNESIUM SERPL-MCNC: 1.7 MG/DL (ref 1.6–2.6)
MCH RBC QN AUTO: 31.4 PG (ref 25.2–33.5)
MCHC RBC AUTO-ENTMCNC: 37.3 G/DL (ref 25.2–33.5)
MCV RBC AUTO: 84.2 FL (ref 82.6–102.9)
MONOCYTES NFR BLD: 0.69 K/UL (ref 0.1–1.2)
MONOCYTES NFR BLD: 9 % (ref 3–12)
MUCOUS THREADS URNS QL MICRO: ABNORMAL
NEUTROPHILS NFR BLD: 73 % (ref 36–65)
NEUTS SEG NFR BLD: 5.29 K/UL (ref 1.5–8.1)
NITRITE UR QL STRIP: NEGATIVE
NRBC BLD-RTO: 0 PER 100 WBC
OSMOLALITY SERPL: 250 MOSM/KG (ref 275–295)
OSMOLALITY UR: 463 MOSM/KG (ref 80–1300)
PH UR STRIP: 7 [PH] (ref 5–6)
PLATELET # BLD AUTO: 347 K/UL (ref 138–453)
PMV BLD AUTO: 8 FL (ref 8.1–13.5)
POTASSIUM SERPL-SCNC: 3.8 MMOL/L (ref 3.7–5.3)
POTASSIUM SERPL-SCNC: 4 MMOL/L (ref 3.7–5.3)
POTASSIUM SERPL-SCNC: 4 MMOL/L (ref 3.7–5.3)
PROT SERPL-MCNC: 6.3 G/DL (ref 6.6–8.7)
PROT SERPL-MCNC: 7.2 G/DL (ref 6.4–8.3)
PROT UR STRIP-MCNC: NEGATIVE MG/DL
RBC # BLD AUTO: 4.3 M/UL (ref 4.21–5.77)
RBC #/AREA URNS HPF: ABNORMAL /HPF (ref 0–4)
SODIUM SERPL-SCNC: 120 MMOL/L (ref 135–144)
SODIUM SERPL-SCNC: 121 MMOL/L (ref 136–145)
SODIUM SERPL-SCNC: 124 MMOL/L (ref 136–145)
SODIUM UR-SCNC: 64 MMOL/L
SP GR UR STRIP: 1.02 (ref 1.01–1.02)
TROPONIN I SERPL HS-MCNC: 14 NG/L (ref 0–22)
UROBILINOGEN UR STRIP-ACNC: NORMAL EU/DL (ref 0–1)
WBC #/AREA URNS HPF: ABNORMAL /HPF (ref 0–4)
WBC OTHER # BLD: 7.3 K/UL (ref 3.5–11.3)

## 2025-01-04 PROCEDURE — 80053 COMPREHEN METABOLIC PANEL: CPT

## 2025-01-04 PROCEDURE — 83935 ASSAY OF URINE OSMOLALITY: CPT

## 2025-01-04 PROCEDURE — 83735 ASSAY OF MAGNESIUM: CPT

## 2025-01-04 PROCEDURE — 93005 ELECTROCARDIOGRAM TRACING: CPT | Performed by: EMERGENCY MEDICINE

## 2025-01-04 PROCEDURE — 2580000003 HC RX 258: Performed by: EMERGENCY MEDICINE

## 2025-01-04 PROCEDURE — 51798 US URINE CAPACITY MEASURE: CPT

## 2025-01-04 PROCEDURE — 2500000003 HC RX 250 WO HCPCS: Performed by: INTERNAL MEDICINE

## 2025-01-04 PROCEDURE — 84300 ASSAY OF URINE SODIUM: CPT

## 2025-01-04 PROCEDURE — 99285 EMERGENCY DEPT VISIT HI MDM: CPT

## 2025-01-04 PROCEDURE — 71045 X-RAY EXAM CHEST 1 VIEW: CPT

## 2025-01-04 PROCEDURE — 81001 URINALYSIS AUTO W/SCOPE: CPT

## 2025-01-04 PROCEDURE — 83930 ASSAY OF BLOOD OSMOLALITY: CPT

## 2025-01-04 PROCEDURE — 80051 ELECTROLYTE PANEL: CPT

## 2025-01-04 PROCEDURE — 99223 1ST HOSP IP/OBS HIGH 75: CPT | Performed by: STUDENT IN AN ORGANIZED HEALTH CARE EDUCATION/TRAINING PROGRAM

## 2025-01-04 PROCEDURE — 85025 COMPLETE CBC W/AUTO DIFF WBC: CPT

## 2025-01-04 PROCEDURE — 82533 TOTAL CORTISOL: CPT

## 2025-01-04 PROCEDURE — 6360000002 HC RX W HCPCS: Performed by: INTERNAL MEDICINE

## 2025-01-04 PROCEDURE — 36415 COLL VENOUS BLD VENIPUNCTURE: CPT

## 2025-01-04 PROCEDURE — 85652 RBC SED RATE AUTOMATED: CPT

## 2025-01-04 PROCEDURE — 84484 ASSAY OF TROPONIN QUANT: CPT

## 2025-01-04 PROCEDURE — 72131 CT LUMBAR SPINE W/O DYE: CPT

## 2025-01-04 PROCEDURE — 2060000000 HC ICU INTERMEDIATE R&B

## 2025-01-04 PROCEDURE — 82248 BILIRUBIN DIRECT: CPT

## 2025-01-04 RX ORDER — 3% SODIUM CHLORIDE 3 G/100ML
50 INJECTION, SOLUTION INTRAVENOUS ONCE
Status: COMPLETED | OUTPATIENT
Start: 2025-01-04 | End: 2025-01-04

## 2025-01-04 RX ORDER — ACETAMINOPHEN 650 MG/1
650 SUPPOSITORY RECTAL EVERY 6 HOURS PRN
Status: DISCONTINUED | OUTPATIENT
Start: 2025-01-04 | End: 2025-01-09 | Stop reason: HOSPADM

## 2025-01-04 RX ORDER — ENOXAPARIN SODIUM 100 MG/ML
40 INJECTION SUBCUTANEOUS DAILY
Status: DISCONTINUED | OUTPATIENT
Start: 2025-01-04 | End: 2025-01-09 | Stop reason: HOSPADM

## 2025-01-04 RX ORDER — POLYETHYLENE GLYCOL 3350 17 G/17G
17 POWDER, FOR SOLUTION ORAL DAILY PRN
Status: DISCONTINUED | OUTPATIENT
Start: 2025-01-04 | End: 2025-01-09 | Stop reason: HOSPADM

## 2025-01-04 RX ORDER — BISACODYL 10 MG
10 SUPPOSITORY, RECTAL RECTAL DAILY PRN
Status: DISCONTINUED | OUTPATIENT
Start: 2025-01-04 | End: 2025-01-09 | Stop reason: HOSPADM

## 2025-01-04 RX ORDER — SODIUM CHLORIDE 9 MG/ML
INJECTION, SOLUTION INTRAVENOUS PRN
Status: DISCONTINUED | OUTPATIENT
Start: 2025-01-04 | End: 2025-01-09 | Stop reason: HOSPADM

## 2025-01-04 RX ORDER — ONDANSETRON 2 MG/ML
4 INJECTION INTRAMUSCULAR; INTRAVENOUS EVERY 6 HOURS PRN
Status: DISCONTINUED | OUTPATIENT
Start: 2025-01-04 | End: 2025-01-09 | Stop reason: HOSPADM

## 2025-01-04 RX ORDER — POTASSIUM CHLORIDE 1500 MG/1
40 TABLET, EXTENDED RELEASE ORAL PRN
Status: DISCONTINUED | OUTPATIENT
Start: 2025-01-04 | End: 2025-01-09 | Stop reason: HOSPADM

## 2025-01-04 RX ORDER — MULTIVITAMIN WITH IRON
1 TABLET ORAL DAILY
Status: DISCONTINUED | OUTPATIENT
Start: 2025-01-04 | End: 2025-01-09 | Stop reason: HOSPADM

## 2025-01-04 RX ORDER — PANTOPRAZOLE SODIUM 40 MG/1
40 TABLET, DELAYED RELEASE ORAL DAILY
Status: DISCONTINUED | OUTPATIENT
Start: 2025-01-04 | End: 2025-01-09 | Stop reason: HOSPADM

## 2025-01-04 RX ORDER — ONDANSETRON 4 MG/1
4 TABLET, ORALLY DISINTEGRATING ORAL EVERY 8 HOURS PRN
Status: DISCONTINUED | OUTPATIENT
Start: 2025-01-04 | End: 2025-01-09 | Stop reason: HOSPADM

## 2025-01-04 RX ORDER — SODIUM CHLORIDE 0.9 % (FLUSH) 0.9 %
5-40 SYRINGE (ML) INJECTION EVERY 12 HOURS SCHEDULED
Status: DISCONTINUED | OUTPATIENT
Start: 2025-01-04 | End: 2025-01-09 | Stop reason: HOSPADM

## 2025-01-04 RX ORDER — POTASSIUM CHLORIDE 7.45 MG/ML
10 INJECTION INTRAVENOUS PRN
Status: DISCONTINUED | OUTPATIENT
Start: 2025-01-04 | End: 2025-01-09 | Stop reason: HOSPADM

## 2025-01-04 RX ORDER — ASPIRIN 81 MG/1
81 TABLET, CHEWABLE ORAL EVERY OTHER DAY
Status: DISCONTINUED | OUTPATIENT
Start: 2025-01-04 | End: 2025-01-09

## 2025-01-04 RX ORDER — OMEGA-3-ACID ETHYL ESTERS 1 G/1
1 CAPSULE, LIQUID FILLED ORAL DAILY
Status: DISCONTINUED | OUTPATIENT
Start: 2025-01-04 | End: 2025-01-09 | Stop reason: HOSPADM

## 2025-01-04 RX ORDER — ACETAMINOPHEN 325 MG/1
650 TABLET ORAL EVERY 6 HOURS PRN
Status: DISCONTINUED | OUTPATIENT
Start: 2025-01-04 | End: 2025-01-09 | Stop reason: HOSPADM

## 2025-01-04 RX ORDER — SODIUM CHLORIDE 0.9 % (FLUSH) 0.9 %
5-40 SYRINGE (ML) INJECTION PRN
Status: DISCONTINUED | OUTPATIENT
Start: 2025-01-04 | End: 2025-01-09 | Stop reason: HOSPADM

## 2025-01-04 RX ADMIN — SODIUM CHLORIDE 50 ML: 3 INJECTION, SOLUTION INTRAVENOUS at 10:30

## 2025-01-04 RX ADMIN — SODIUM CHLORIDE, PRESERVATIVE FREE 5 ML: 5 INJECTION INTRAVENOUS at 20:07

## 2025-01-04 RX ADMIN — ENOXAPARIN SODIUM 40 MG: 100 INJECTION SUBCUTANEOUS at 15:18

## 2025-01-04 ASSESSMENT — PAIN SCALES - GENERAL
PAINLEVEL_OUTOF10: 8
PAINLEVEL_OUTOF10: 0

## 2025-01-04 ASSESSMENT — PAIN - FUNCTIONAL ASSESSMENT: PAIN_FUNCTIONAL_ASSESSMENT: 0-10

## 2025-01-04 ASSESSMENT — PAIN DESCRIPTION - LOCATION: LOCATION: OTHER (COMMENT)

## 2025-01-04 NOTE — ED PROVIDER NOTES
[facility] EMERGENCY DEPARTMENT  EMERGENCY DEPARTMENT ENCOUNTER      Pt Name: Manish Roblero  MRN: [id number]  Birthdate 1955  Date of evaluation: 1/4/2025  Provider: Naga Garcia MD    CHIEF COMPLAINT     Chief Complaint   Patient presents with    Insomnia    Extremity Weakness     Pt having difficulty getting out of bed today, describes leg weakness x2 wks.          HISTORY OF PRESENT ILLNESS   (Location/Symptom, Timing/Onset, Context/Setting,Quality, Duration, Modifying Factors, Severity)  Note limiting factors.   Manish Roblero is a 69 y.o. male who presents to the emergency department stating he could not sleep all night and called his son this morning stating that he was too weak to get out of bed.  Patient states that he is experiencing leg weakness and has difficulty walking.  Patient has had a number of somatic complaints over the last several weeks.  He lives alone and is retired.  He was diagnosed with depressive disorder and started on Wellbutrin 150 mg daily which he started taking 3 days ago.  He also states he has a history of chronic interstitial cystitis dating back 20 years.  He states he takes partially to control his symptoms.  He has been scheduled for a visit with a urologist next month.  Recently he was advised to drink extra fluids by his physician and in the last 4 days has been drinking extra water. He quit smoking in 1979 and denies alcohol consumption.      HPI    Nursing Notes werereviewed.    REVIEW OF SYSTEMS    (2-9 systems for level 4, 10 or more for level 5)     Review of Systems   All other systems reviewed and are negative.      Except as noted above the remainder of the review of systems was reviewed and negative.       PAST MEDICAL HISTORY     Past Medical History:   Diagnosis Date    Acquired deformity of toe     Chronic prostatitis     Disorder of bursae and tendons in shoulder region     GERD with esophagitis     Hoarseness     Mixed hyperlipidemia     Primary

## 2025-01-04 NOTE — H&P
He has a 1 pack-year smoking history. He has never used smokeless tobacco.  Alcohol:      reports no history of alcohol use.  Drug Use:  reports no history of drug use.    Family History:     Family History   Problem Relation Age of Onset    Diabetes Mother     Pneumonia Mother     Diabetes Father     Colon Cancer Father 72    Stroke Maternal Grandmother     Heart Failure Paternal Grandfather      Review of Systems:     Positive and Negative as described in HPI.    CONSTITUTIONAL: Positive for fatigue. Negative for fevers, chills, sweats, weight loss  HEENT:  negative for vision, hearing changes, runny nose, throat pain  RESPIRATORY:  negative for shortness of breath, cough, congestion, wheezing  CARDIOVASCULAR:  negative for chest pain, palpitations  GASTROINTESTINAL: Positive for intermittent diarrhea. Negative for nausea, vomiting,  constipation, change in bowel habits, abdominal pain   GENITOURINARY: Positive for difficulty of urination, burning with urination, frequency   INTEGUMENT:  negative for rash, skin lesions, easy bruising   HEMATOLOGIC/LYMPHATIC:  negative for swelling/edema   MUSCULOSKELETAL:  negative joint pains, muscle aches, swelling of joints  NEUROLOGICAL: Positive for lightheadedness and tremor improved prior to transfer. Negative for headaches, dizziness, numbness, pain, tingling extremities  BEHAVIOR/PSYCH:  negative for depression, anxiety    Physical Exam:   /77   Pulse 74   Temp 97.7 °F (36.5 °C) (Oral)   Resp 16   Ht 1.854 m (6' 1\")   Wt 69.4 kg (153 lb)   SpO2 98%   BMI 20.19 kg/m²   Temp (24hrs), Av.7 °F (36.5 °C), Min:97.3 °F (36.3 °C), Max:98.1 °F (36.7 °C)    No results for input(s): \"POCGLU\" in the last 72 hours.  No intake or output data in the 24 hours ending 25 1634    General Appearance:  alert, well appearing, and in no acute distress  Mental status: oriented to person, place, and time as well as situation  Head:  normocephalic, atraumatic  Eye: no

## 2025-01-04 NOTE — PLAN OF CARE
Patient admitted from [place] ED to room 1001. Telemetry in place, admission data base completed, oriented to room, questions answered.   Orders to notify nephrology of sodium levels, Q6 check.   600/1500 FR used.   A/Ox4, on RA and ambulates 1 assist with walker.   Vitals have remained stable, denies pain.   Bladder scan done, showing 216mL, primary notified.   Patient is a high fall risk, bed alarm is on, bed in lowest position, call light within reach.   Problem: Discharge Planning  Goal: Discharge to home or other facility with appropriate resources  Outcome: Progressing     Problem: Safety - Adult  Goal: Free from fall injury  Outcome: Progressing     Problem: Skin/Tissue Integrity - Adult  Goal: Skin integrity remains intact  Outcome: Progressing     Problem: Musculoskeletal - Adult  Goal: Return mobility to safest level of function  Outcome: Progressing     Problem: Genitourinary - Adult  Goal: Absence of urinary retention  Outcome: Progressing

## 2025-01-05 LAB
ANION GAP SERPL CALCULATED.3IONS-SCNC: 10 MMOL/L (ref 9–16)
ANION GAP SERPL CALCULATED.3IONS-SCNC: 11 MMOL/L (ref 9–16)
ANION GAP SERPL CALCULATED.3IONS-SCNC: 11 MMOL/L (ref 9–16)
ANION GAP SERPL CALCULATED.3IONS-SCNC: 9 MMOL/L (ref 9–16)
BASOPHILS # BLD: 0.05 K/UL (ref 0–0.2)
BASOPHILS NFR BLD: 1 % (ref 0–2)
BUN SERPL-MCNC: 13 MG/DL (ref 8–23)
CALCIUM SERPL-MCNC: 9.2 MG/DL (ref 8.8–10.2)
CHLORIDE SERPL-SCNC: 91 MMOL/L (ref 98–107)
CHLORIDE SERPL-SCNC: 93 MMOL/L (ref 98–107)
CHLORIDE SERPL-SCNC: 94 MMOL/L (ref 98–107)
CHLORIDE SERPL-SCNC: 95 MMOL/L (ref 98–107)
CO2 SERPL-SCNC: 23 MMOL/L (ref 20–31)
CO2 SERPL-SCNC: 24 MMOL/L (ref 20–31)
CORTIS SERPL-MCNC: 23.7 UG/DL (ref 2.5–19.5)
CREAT SERPL-MCNC: 0.9 MG/DL (ref 0.7–1.2)
EOSINOPHIL # BLD: 0.18 K/UL (ref 0–0.44)
EOSINOPHILS RELATIVE PERCENT: 3 % (ref 1–4)
ERYTHROCYTE [DISTWIDTH] IN BLOOD BY AUTOMATED COUNT: 11.4 % (ref 11.8–14.4)
GFR, ESTIMATED: >90 ML/MIN/1.73M2
GLUCOSE SERPL-MCNC: 94 MG/DL (ref 82–115)
HCT VFR BLD AUTO: 36.4 % (ref 40.7–50.3)
HGB BLD-MCNC: 13.4 G/DL (ref 13–17)
IMM GRANULOCYTES # BLD AUTO: 0.01 K/UL (ref 0–0.3)
IMM GRANULOCYTES NFR BLD: 0 %
LYMPHOCYTES NFR BLD: 1.94 K/UL (ref 1.1–3.7)
LYMPHOCYTES RELATIVE PERCENT: 29 % (ref 24–43)
MAGNESIUM SERPL-MCNC: 2.1 MG/DL (ref 1.6–2.4)
MCH RBC QN AUTO: 32.1 PG (ref 25.2–33.5)
MCHC RBC AUTO-ENTMCNC: 36.8 G/DL (ref 28.4–34.8)
MCV RBC AUTO: 87.1 FL (ref 82.6–102.9)
MONOCYTES NFR BLD: 0.87 K/UL (ref 0.1–1.2)
MONOCYTES NFR BLD: 13 % (ref 3–12)
NEUTROPHILS NFR BLD: 54 % (ref 36–65)
NEUTS SEG NFR BLD: 3.6 K/UL (ref 1.5–8.1)
NRBC BLD-RTO: 0 PER 100 WBC
PLATELET # BLD AUTO: 316 K/UL (ref 138–453)
PMV BLD AUTO: 8.2 FL (ref 8.1–13.5)
POTASSIUM SERPL-SCNC: 3.9 MMOL/L (ref 3.7–5.3)
POTASSIUM SERPL-SCNC: 4 MMOL/L (ref 3.7–5.3)
POTASSIUM SERPL-SCNC: 4 MMOL/L (ref 3.7–5.3)
POTASSIUM SERPL-SCNC: 4.1 MMOL/L (ref 3.7–5.3)
RBC # BLD AUTO: 4.18 M/UL (ref 4.21–5.77)
SODIUM SERPL-SCNC: 125 MMOL/L (ref 136–145)
SODIUM SERPL-SCNC: 125 MMOL/L (ref 136–145)
SODIUM SERPL-SCNC: 126 MMOL/L (ref 136–145)
SODIUM SERPL-SCNC: 128 MMOL/L (ref 136–145)
TSH SERPL DL<=0.05 MIU/L-ACNC: 1.61 UIU/ML (ref 0.27–4.2)
WBC OTHER # BLD: 6.7 K/UL (ref 3.5–11.3)

## 2025-01-05 PROCEDURE — 51798 US URINE CAPACITY MEASURE: CPT

## 2025-01-05 PROCEDURE — 97530 THERAPEUTIC ACTIVITIES: CPT

## 2025-01-05 PROCEDURE — 99232 SBSQ HOSP IP/OBS MODERATE 35: CPT | Performed by: INTERNAL MEDICINE

## 2025-01-05 PROCEDURE — 80048 BASIC METABOLIC PNL TOTAL CA: CPT

## 2025-01-05 PROCEDURE — 82533 TOTAL CORTISOL: CPT

## 2025-01-05 PROCEDURE — 2500000003 HC RX 250 WO HCPCS: Performed by: INTERNAL MEDICINE

## 2025-01-05 PROCEDURE — 6370000000 HC RX 637 (ALT 250 FOR IP)

## 2025-01-05 PROCEDURE — 97161 PT EVAL LOW COMPLEX 20 MIN: CPT

## 2025-01-05 PROCEDURE — 83735 ASSAY OF MAGNESIUM: CPT

## 2025-01-05 PROCEDURE — 6360000002 HC RX W HCPCS: Performed by: INTERNAL MEDICINE

## 2025-01-05 PROCEDURE — 97110 THERAPEUTIC EXERCISES: CPT

## 2025-01-05 PROCEDURE — 84443 ASSAY THYROID STIM HORMONE: CPT

## 2025-01-05 PROCEDURE — 2060000000 HC ICU INTERMEDIATE R&B

## 2025-01-05 PROCEDURE — 85025 COMPLETE CBC W/AUTO DIFF WBC: CPT

## 2025-01-05 PROCEDURE — 36415 COLL VENOUS BLD VENIPUNCTURE: CPT

## 2025-01-05 PROCEDURE — 80051 ELECTROLYTE PANEL: CPT

## 2025-01-05 PROCEDURE — 94761 N-INVAS EAR/PLS OXIMETRY MLT: CPT

## 2025-01-05 RX ORDER — SODIUM CHLORIDE 1 G/1
1 TABLET ORAL
Status: DISCONTINUED | OUTPATIENT
Start: 2025-01-05 | End: 2025-01-08

## 2025-01-05 RX ADMIN — Medication 1 G: at 17:19

## 2025-01-05 RX ADMIN — Medication 1 G: at 12:29

## 2025-01-05 RX ADMIN — ENOXAPARIN SODIUM 40 MG: 100 INJECTION SUBCUTANEOUS at 08:08

## 2025-01-05 RX ADMIN — SODIUM CHLORIDE, PRESERVATIVE FREE 10 ML: 5 INJECTION INTRAVENOUS at 08:09

## 2025-01-05 NOTE — FLOWSHEET NOTE
01/04/25 2100   Urine Assessment   Urinary Status Voiding;Retention   Urine Color Yellow/straw   Urine Appearance Clear   Bladder Scan Volume (mL) 271 mL   $ Bladder scan $ Yes     Provider notified due to bladder scan volume greater than 250mL, no new orders, continue to monitor output at this time.    appears normal and intact

## 2025-01-05 NOTE — PLAN OF CARE
Patient is A/Ox4, on RA and ambulates standby assist with gait belt.   Worked with PT/OT today and tolerated well.   1500FR in place, 720/1500  Bladder scan continues per nephrology.   Sodium check at 1200 (126), next draw at 1800.  Bladder scan done after voiding showing 56mL.  Sodium tablets started, goal sodium levels 129.  Vitals remained stable, denies pain.   Plan of care reviewed, questions answered, bed alarm is on, bed in lowest position, call light within reach.   Problem: Discharge Planning  Goal: Discharge to home or other facility with appropriate resources  1/5/2025 1035 by Luzmaria Roche, RN  Outcome: Progressing     Problem: Safety - Adult  Goal: Free from fall injury  1/5/2025 1035 by Luzmaria Roche, RN  Outcome: Progressing     Problem: Skin/Tissue Integrity - Adult  Goal: Skin integrity remains intact  1/5/2025 1035 by Luzmaria Roche, RN  Outcome: Progressing     Problem: Musculoskeletal - Adult  Goal: Return mobility to safest level of function  Outcome: Progressing     Problem: Genitourinary - Adult  Goal: Absence of urinary retention  1/5/2025 1035 by Luzmaria Roche, RN  Outcome: Progressing

## 2025-01-05 NOTE — PLAN OF CARE
Pt had a restless night due to frequent urination, pt remains weak, needing 1 assist to the bathroom with walker. Output in urinal was 800mL and he had a small bowel movement. Sodiums were drawn at 2000 (124) 0000 (125), next draw is at 0600. Spoke with Dr. Sharma with new orders to bladder scan, urine post void was 271. Will contact if sodium is greater than 126.     Problem: Discharge Planning  Goal: Discharge to home or other facility with appropriate resources  Outcome: Progressing  Discharge to home or other facility with appropriate resources:   Identify barriers to discharge with patient and caregiver   Arrange for needed discharge resources and transportation as appropriate   Identify discharge learning needs (meds, wound care, etc)      Problem: Safety - Adult  Goal: Free from fall injury  Outcome: Progressing  Free From Fall Injury:   Instruct family/caregiver on patient safety   Based on caregiver fall risk screen, instruct family/caregiver to ask for assistance with transferring infant if caregiver noted to have fall risk factors      Problem: Skin/Tissue Integrity - Adult  Goal: Skin integrity remains intact  Outcome: Progressing  Skin Integrity Remains Intact:   Monitor for areas of redness and/or skin breakdown   Assess vascular access sites hourly   Every 4-6 hours minimum: Change oxygen saturation probe site      Problem: Genitourinary - Adult  Goal: Absence of urinary retention  Absence of urinary retention:   Assess patient’s ability to void and empty bladder   Monitor intake/output and perform bladder scan as needed

## 2025-01-05 NOTE — CARE COORDINATION
Case Management Assessment  Initial Evaluation    Date/Time of Evaluation: 1/5/2025 2:17 PM  Assessment Completed by: Jenelle Ojeda RN    If patient is discharged prior to next notation, then this note serves as note for discharge by case management.    Patient Name: Manish Roblero                   YOB: 1955  Diagnosis: Hyponatremia [E87.1]                   Date / Time: 1/4/2025  1:18 PM    Patient Admission Status: Inpatient   Readmission Risk (Low < 19, Mod (19-27), High > 27): Readmission Risk Score: 8.6    Current PCP: Rowdy Landis MD  PCP verified by CM? Yes    Chart Reviewed: Yes      History Provided by: Patient  Patient Orientation: Alert and Oriented    Patient Cognition: Alert    Hospitalization in the last 30 days (Readmission):  No    If yes, Readmission Assessment in  Navigator will be completed.    Advance Directives:      Code Status: Full Code   Patient's Primary Decision Maker is: Legal Next of Kin      Discharge Planning:    Patient lives with: Alone Type of Home: House  Primary Care Giver: Self  Patient Support Systems include: Children, Family Members   Current Financial resources: Medicare  Current community resources: Other (Comment) ([facility])  Current services prior to admission: Durable Medical Equipment            Current DME: Walker            Type of Home Care services:  None    ADLS  Prior functional level: Assistance with the following:, Mobility  Current functional level: Assistance with the following:, Mobility    PT AM-PAC: 19 /24  OT AM-PAC:   /24    Family can provide assistance at DC: Yes  Would you like Case Management to discuss the discharge plan with any other family members/significant others, and if so, who? No  Plans to Return to Present Housing: Yes  Other Identified Issues/Barriers to RETURNING to current housing: na   Potential Assistance needed at discharge: N/A            Potential DME:    Patient expects to discharge to: House  Plan for

## 2025-01-06 ENCOUNTER — APPOINTMENT (OUTPATIENT)
Dept: CT IMAGING | Age: 70
DRG: 645 | End: 2025-01-06
Attending: INTERNAL MEDICINE
Payer: MEDICARE

## 2025-01-06 LAB
AMORPH SED URNS QL MICRO: ABNORMAL
ANION GAP SERPL CALCULATED.3IONS-SCNC: 11 MMOL/L (ref 9–16)
ANION GAP SERPL CALCULATED.3IONS-SCNC: 8 MMOL/L (ref 9–16)
BASOPHILS # BLD: 0.05 K/UL (ref 0–0.2)
BASOPHILS NFR BLD: 1 % (ref 0–2)
BILIRUB UR QL STRIP: NEGATIVE
BUN SERPL-MCNC: 15 MG/DL (ref 8–23)
CALCIUM SERPL-MCNC: 9.2 MG/DL (ref 8.8–10.2)
CHLORIDE SERPL-SCNC: 95 MMOL/L (ref 98–107)
CHLORIDE SERPL-SCNC: 96 MMOL/L (ref 98–107)
CLARITY UR: CLEAR
CO2 SERPL-SCNC: 22 MMOL/L (ref 20–31)
CO2 SERPL-SCNC: 25 MMOL/L (ref 20–31)
COLOR UR: YELLOW
CREAT SERPL-MCNC: 0.9 MG/DL (ref 0.7–1.2)
EKG ATRIAL RATE: 78 BPM
EKG P AXIS: 74 DEGREES
EKG P-R INTERVAL: 224 MS
EKG Q-T INTERVAL: 378 MS
EKG QRS DURATION: 86 MS
EKG QTC CALCULATION (BAZETT): 430 MS
EKG R AXIS: 26 DEGREES
EKG T AXIS: 58 DEGREES
EKG VENTRICULAR RATE: 78 BPM
EOSINOPHIL # BLD: 0.26 K/UL (ref 0–0.44)
EOSINOPHILS RELATIVE PERCENT: 4 % (ref 1–4)
EPI CELLS #/AREA URNS HPF: ABNORMAL /HPF (ref 0–5)
ERYTHROCYTE [DISTWIDTH] IN BLOOD BY AUTOMATED COUNT: 11.8 % (ref 11.8–14.4)
GFR, ESTIMATED: 90 ML/MIN/1.73M2
GLUCOSE SERPL-MCNC: 97 MG/DL (ref 82–115)
GLUCOSE UR STRIP-MCNC: NEGATIVE MG/DL
HCT VFR BLD AUTO: 37.8 % (ref 40.7–50.3)
HGB BLD-MCNC: 14.1 G/DL (ref 13–17)
HGB UR QL STRIP.AUTO: ABNORMAL
IMM GRANULOCYTES # BLD AUTO: 0.04 K/UL (ref 0–0.3)
IMM GRANULOCYTES NFR BLD: 1 %
KETONES UR STRIP-MCNC: NEGATIVE MG/DL
LEUKOCYTE ESTERASE UR QL STRIP: NEGATIVE
LYMPHOCYTES NFR BLD: 1.81 K/UL (ref 1.1–3.7)
LYMPHOCYTES RELATIVE PERCENT: 26 % (ref 24–43)
MAGNESIUM SERPL-MCNC: 2.2 MG/DL (ref 1.6–2.4)
MCH RBC QN AUTO: 33.5 PG (ref 25.2–33.5)
MCHC RBC AUTO-ENTMCNC: 37.3 G/DL (ref 28–38)
MCV RBC AUTO: 89.8 FL (ref 82.6–102.9)
MONOCYTES NFR BLD: 0.86 K/UL (ref 0.1–1.2)
MONOCYTES NFR BLD: 12 % (ref 3–12)
MUCOUS THREADS URNS QL MICRO: ABNORMAL
NEUTROPHILS NFR BLD: 57 % (ref 36–65)
NEUTS SEG NFR BLD: 3.95 K/UL (ref 1.5–8.1)
NITRITE UR QL STRIP: NEGATIVE
PH UR STRIP: 7 [PH] (ref 5–8)
PLATELET # BLD AUTO: 343 K/UL (ref 138–453)
PMV BLD AUTO: 8.2 FL (ref 8.1–13.5)
POTASSIUM SERPL-SCNC: 4.1 MMOL/L (ref 3.7–5.3)
POTASSIUM SERPL-SCNC: 4.5 MMOL/L (ref 3.7–5.3)
PROT UR STRIP-MCNC: NEGATIVE MG/DL
RBC # BLD AUTO: 4.21 M/UL (ref 4.21–5.77)
RBC #/AREA URNS HPF: ABNORMAL /HPF (ref 0–2)
SODIUM SERPL-SCNC: 129 MMOL/L (ref 136–145)
SODIUM SERPL-SCNC: 129 MMOL/L (ref 136–145)
SP GR UR STRIP: 1.01 (ref 1–1.03)
UROBILINOGEN UR STRIP-ACNC: NORMAL EU/DL (ref 0–1)
WBC #/AREA URNS HPF: ABNORMAL /HPF (ref 0–5)
WBC OTHER # BLD: 7 K/UL (ref 3.5–11.3)

## 2025-01-06 PROCEDURE — 97535 SELF CARE MNGMENT TRAINING: CPT

## 2025-01-06 PROCEDURE — 81001 URINALYSIS AUTO W/SCOPE: CPT

## 2025-01-06 PROCEDURE — 2060000000 HC ICU INTERMEDIATE R&B

## 2025-01-06 PROCEDURE — 6370000000 HC RX 637 (ALT 250 FOR IP): Performed by: INTERNAL MEDICINE

## 2025-01-06 PROCEDURE — 85025 COMPLETE CBC W/AUTO DIFF WBC: CPT

## 2025-01-06 PROCEDURE — 6360000002 HC RX W HCPCS: Performed by: INTERNAL MEDICINE

## 2025-01-06 PROCEDURE — 74176 CT ABD & PELVIS W/O CONTRAST: CPT

## 2025-01-06 PROCEDURE — 2500000003 HC RX 250 WO HCPCS: Performed by: INTERNAL MEDICINE

## 2025-01-06 PROCEDURE — 97166 OT EVAL MOD COMPLEX 45 MIN: CPT

## 2025-01-06 PROCEDURE — 51798 US URINE CAPACITY MEASURE: CPT

## 2025-01-06 PROCEDURE — 80048 BASIC METABOLIC PNL TOTAL CA: CPT

## 2025-01-06 PROCEDURE — 99232 SBSQ HOSP IP/OBS MODERATE 35: CPT | Performed by: INTERNAL MEDICINE

## 2025-01-06 PROCEDURE — 36415 COLL VENOUS BLD VENIPUNCTURE: CPT

## 2025-01-06 PROCEDURE — 83735 ASSAY OF MAGNESIUM: CPT

## 2025-01-06 PROCEDURE — 6370000000 HC RX 637 (ALT 250 FOR IP)

## 2025-01-06 PROCEDURE — 97110 THERAPEUTIC EXERCISES: CPT

## 2025-01-06 RX ADMIN — MULTIVITAMIN TABLET 1 TABLET: TABLET at 07:53

## 2025-01-06 RX ADMIN — ENOXAPARIN SODIUM 40 MG: 100 INJECTION SUBCUTANEOUS at 07:53

## 2025-01-06 RX ADMIN — CHOLECALCIFEROL TAB 125 MCG (5000 UNIT) 5000 UNITS: 125 TAB at 07:53

## 2025-01-06 RX ADMIN — SODIUM CHLORIDE, PRESERVATIVE FREE 10 ML: 5 INJECTION INTRAVENOUS at 20:51

## 2025-01-06 RX ADMIN — Medication 1 G: at 12:11

## 2025-01-06 RX ADMIN — Medication 1 G: at 07:53

## 2025-01-06 RX ADMIN — SODIUM CHLORIDE, PRESERVATIVE FREE 5 ML: 5 INJECTION INTRAVENOUS at 00:24

## 2025-01-06 RX ADMIN — PANTOPRAZOLE SODIUM 40 MG: 40 TABLET, DELAYED RELEASE ORAL at 07:53

## 2025-01-06 RX ADMIN — Medication 1 G: at 16:53

## 2025-01-06 RX ADMIN — ASPIRIN 81 MG CHEWABLE TABLET 81 MG: 81 TABLET CHEWABLE at 07:53

## 2025-01-06 RX ADMIN — SODIUM CHLORIDE, PRESERVATIVE FREE 10 ML: 5 INJECTION INTRAVENOUS at 07:56

## 2025-01-06 ASSESSMENT — PAIN SCALES - GENERAL
PAINLEVEL_OUTOF10: 0
PAINLEVEL_OUTOF10: 0

## 2025-01-06 NOTE — CONSULTS
normal  Cardiovascular:  Normal peripheral pulses  Abdomen: Soft, non-tender, non-distended with no CVA, flank pain, hepatosplenomegaly or hernia.  Kidneys normal.  Bladder non-tender and not distended.  Lymphatics: no palpable lymphadenopathy  Penis normal and circumcised  Urethral meatus normal  Scrotal exam normal  Testicles normal bilaterally   Patient presently voiding, he states that his stream is weak at times but other times he has an excellent stream    LABS:  Recent Labs     01/04/25  0751 01/05/25  0634 01/06/25  0612   WBC 7.3 6.7 7.0   HGB 13.5 13.4 14.1   HCT 36.2* 36.4* 37.8*   MCV 84.2 87.1 89.8    316 343     Recent Labs     01/04/25  1649 01/04/25  2005 01/05/25  0634 01/05/25  1208 01/05/25  1802 01/05/25  2345 01/06/25  0612   *   < > 125*   < > 128* 129* 129*   K 3.8   < > 4.0   < > 4.0 4.5 4.1   CL 86*   < > 93*   < > 95* 95* 96*   CO2 23   < > 23   < > 23 22 25   BUN 15  --  13  --   --   --  15   CREATININE 0.8  --  0.9  --   --   --  0.9    < > = values in this interval not displayed.     Lab Results   Component Value Date    PSA 2.20 09/16/2024    PSA 2.06 08/03/2023    PSA 1.11 01/19/2015       Additional Lab/culture results:    Urinalysis:   Recent Labs     01/04/25  0846   COLORU Yellow   PHUR 7.0*   WBCUA 2 TO 5   RBCUA 5 TO 10   MUCUS 1+*   BACTERIA None   LEUKOCYTESUR NEGATIVE   UROBILINOGEN Normal   BILIRUBINUR NEGATIVE        -----------------------------------------------------------------  Imaging Results: CT abdomen and pelvis has been ordered results pending   We will also obtain a  urine culture patient had microhematuria and 2-5 WBCs in the urine sample from 2 days ago    Assessment and Plan   Impression:    Patient Active Problem List   Diagnosis    Plantar fascial fibromatosis R/L    Acquired deformity of toe    Depression    GERD with esophagitis    Primary osteoarthritis of wrist    Disorder of bursae and tendons in shoulder region    Mixed hyperlipidemia    
AM     Urine Sodium:   No components found for: \"ELPIDIO\"  Urine Potassium:  No results found for: \"KUR\"  Urine Chloride:  No results found for: \"CLUR\"  Urine Osmolarity:   Lab Results   Component Value Date/Time    YAJAIRA 463 01/04/2025 08:46 AM     Urine Protein:   No results found for: \"TPU\"  Urine Creatinine:   No results found for: \"LABCREA\"  UPC:     Urine Eosinophils:  No components found for: \"UEOS\"    Radiology:     CXR: Shows no acute process    Assessment:     1.  Hyponatremia secondary to SIADH considering his significant suprapubic pain, also recently started on Wellbutrin, sodium was as low as 120 on presentation to [place] with urine sodium 64 urine osmolality 463, sodium is up to 125 today  2.  Interstitial cystitis, urology is consulted  3.  Hyperlipidemia  4.  GERD    Plan:   1.  Continue 1500 mL fluid restriction and start sodium tablets.  With goal sodium of around 129 tomorrow morning.  2.  Continue bladder scans and double void, monitor need for catheter placement.  3.  Continue off Wellbutrin and herbal supplements.  4.  Check lites every 6 hours  5.  Will follow n  Please ensure that patient is on a renal diet/TF. Avoid nephrotoxic drugs/contrast exposure.    Thank you for the consultation. Please do not hesitate to contact us for any further questions/concerns. We will continue to follow along with you.     Electronically signed by PING Mast CNP on 1/5/2025 at 8:01 AM

## 2025-01-06 NOTE — CARE COORDINATION
Discharge planning    Patient requesting referral to [facility] [place]. Updated ss.                        Post Acute Facility/Agency List     Provided patient with the following list, the list includes the overall star ratings obtained from CMS per the Medicare Web site (www.Medicare.gov):     [] Long Term Acute Care Facilities  [] Acute Inpatient Rehabilitation Facilities  [x] Skilled Nursing Facilities  [] Hospice Facilities  [x] Home Care    Provided verbal instructions on how to utilize the QR Code to obtain additional detailed star ratings from www.Medicare.gov     offered to print and provide the detailed list:    []Accepted   [x]Declined        Social work placed referral to [facility] and writer sent referral to [facility] [place].     [facility]  Phone [phone]  Fax [phone]

## 2025-01-06 NOTE — CARE COORDINATION
Social Work-Met with patient. He states that he would like a referral to [facility]. Sent referral. Moris

## 2025-01-06 NOTE — CARE COORDINATION
Referral for home care           Name: Maddi Roblero : 1955 (69 yrs)   Address: [address] [place] [address] Sex: Male   City: [place] [address]         Marital Status:    Employer: [employer]         [employer]: [employer]   Primary Care Provider: Rowdy Landis MD         Primary Phone: [phone]   EMERGENCY CONTACT   Name Home Phone Work Phone Mobile Phone Relationship Lgl Grkaren   [facility] [phone]     Child           GUARANTOR            Guarantor: Maddi Roblero     : 1955   Address: [address] [place] [address] Sex: [place] [address]     Relation to Patient: Self       Home Phone: [phone]   Guarantor ID: [id number]       Work Phone:     Guarantor Employer: [employer]         Status: FULL TIME      COVERAGE        PRIMARY INSURANCE   Payor: [facility] MEDICARE Plan: [facility] MEDICARE COMPLETE   Payor Address: ,          Group Number: [id number] Insurance Type: INDEMNITY   Subscriber Name: CRYSMADDI Subscriber : 1955   Subscriber ID: [id number] Pat. Rel. to Sub: Self

## 2025-01-06 NOTE — CARE COORDINATION
Discharge planning    Met with patient to follow up on plan of care. He wishes to go to facility because he lives alone. Therapy to work with him today and will need to see how he does. Last session walked 75 feet. Explained likely be a denial and should plan for home care.     Also gave patient [facility] help list for post discharge. They can provide 28 meals and some HHA services post discharge.                        Post Acute Facility/Agency List     Provided patient with the following list, the list includes the overall star ratings obtained from CMS per the Medicare Web site (www.Medicare.gov):     [] Long Term Acute Care Facilities  [] Acute Inpatient Rehabilitation Facilities  [x] Skilled Nursing Facilities  [] Hospice Facilities  [x] Home Care    Provided verbal instructions on how to utilize the QR Code to obtain additional detailed star ratings from www.Medicare.gov     offered to print and provide the detailed list:    []Accepted   [x]Declined    Will need to follow up with choices once therapy sees patient.

## 2025-01-06 NOTE — PLAN OF CARE
Pt remains safe and free from falls thus far in shift  Sinus rhythm on cardiac monitor  Na 129, on Nacl tablets tid   Urology ordered urinalysis and CT abdomen/pelvis, see chart for results  Stby assist, sat in chair for most of the day  Redness on coccyx but no open wounds noted   No complaints of pain or urine retention  Discharge planning in progress, Precert pending for [facility] [place], if denied will go home with home care  Call light in reach  Bed locked and lowest position  Bed alarm on for safety  Care ongoing        Problem: Discharge Planning  Goal: Discharge to home or other facility with appropriate resources  Outcome: Progressing     Problem: Safety - Adult  Goal: Free from fall injury  Outcome: Progressing     Problem: Skin/Tissue Integrity - Adult  Goal: Skin integrity remains intact  Outcome: Progressing     Problem: Skin/Tissue Integrity - Adult  Goal: Incisions, wounds, or drain sites healing without S/S of infection  Outcome: Progressing     Problem: Skin/Tissue Integrity - Adult  Goal: Oral mucous membranes remain intact  Outcome: Progressing     Problem: Musculoskeletal - Adult  Goal: Return mobility to safest level of function  Outcome: Progressing     Problem: Musculoskeletal - Adult  Goal: Maintain proper alignment of affected body part  Outcome: Progressing     Problem: Musculoskeletal - Adult  Goal: Return ADL status to a safe level of function  Outcome: Progressing     Problem: Genitourinary - Adult  Goal: Absence of urinary retention  Outcome: Progressing     Problem: Metabolic/Fluid and Electrolytes - Adult  Goal: Electrolytes maintained within normal limits  Outcome: Progressing

## 2025-01-07 LAB
ANION GAP SERPL CALCULATED.3IONS-SCNC: 9 MMOL/L (ref 9–16)
BASOPHILS # BLD: 0.06 K/UL (ref 0–0.2)
BASOPHILS NFR BLD: 1 % (ref 0–2)
BUN SERPL-MCNC: 20 MG/DL (ref 8–23)
CALCIUM SERPL-MCNC: 9.1 MG/DL (ref 8.8–10.2)
CHLORIDE SERPL-SCNC: 100 MMOL/L (ref 98–107)
CO2 SERPL-SCNC: 24 MMOL/L (ref 20–31)
CREAT SERPL-MCNC: 0.9 MG/DL (ref 0.7–1.2)
EOSINOPHIL # BLD: 0.35 K/UL (ref 0–0.44)
EOSINOPHILS RELATIVE PERCENT: 5 % (ref 1–4)
ERYTHROCYTE [DISTWIDTH] IN BLOOD BY AUTOMATED COUNT: 11.9 % (ref 11.8–14.4)
GFR, ESTIMATED: 88 ML/MIN/1.73M2
GLUCOSE SERPL-MCNC: 96 MG/DL (ref 82–115)
HCT VFR BLD AUTO: 36.3 % (ref 40.7–50.3)
HGB BLD-MCNC: 12.9 G/DL (ref 13–17)
IMM GRANULOCYTES # BLD AUTO: 0.01 K/UL (ref 0–0.3)
IMM GRANULOCYTES NFR BLD: 0 %
LYMPHOCYTES NFR BLD: 2 K/UL (ref 1.1–3.7)
LYMPHOCYTES RELATIVE PERCENT: 28 % (ref 24–43)
MAGNESIUM SERPL-MCNC: 2.1 MG/DL (ref 1.6–2.4)
MCH RBC QN AUTO: 31.9 PG (ref 25.2–33.5)
MCHC RBC AUTO-ENTMCNC: 35.5 G/DL (ref 28.4–34.8)
MCV RBC AUTO: 89.9 FL (ref 82.6–102.9)
MONOCYTES NFR BLD: 0.74 K/UL (ref 0.1–1.2)
MONOCYTES NFR BLD: 10 % (ref 3–12)
NEUTROPHILS NFR BLD: 56 % (ref 36–65)
NEUTS SEG NFR BLD: 3.96 K/UL (ref 1.5–8.1)
NRBC BLD-RTO: 0 PER 100 WBC
PLATELET # BLD AUTO: 310 K/UL (ref 138–453)
PMV BLD AUTO: 8.4 FL (ref 8.1–13.5)
POTASSIUM SERPL-SCNC: 4.1 MMOL/L (ref 3.7–5.3)
RBC # BLD AUTO: 4.04 M/UL (ref 4.21–5.77)
SODIUM SERPL-SCNC: 134 MMOL/L (ref 136–145)
WBC OTHER # BLD: 7.1 K/UL (ref 3.5–11.3)

## 2025-01-07 PROCEDURE — 2500000003 HC RX 250 WO HCPCS: Performed by: INTERNAL MEDICINE

## 2025-01-07 PROCEDURE — 85025 COMPLETE CBC W/AUTO DIFF WBC: CPT

## 2025-01-07 PROCEDURE — 6360000002 HC RX W HCPCS: Performed by: INTERNAL MEDICINE

## 2025-01-07 PROCEDURE — 36415 COLL VENOUS BLD VENIPUNCTURE: CPT

## 2025-01-07 PROCEDURE — 83735 ASSAY OF MAGNESIUM: CPT

## 2025-01-07 PROCEDURE — 2060000000 HC ICU INTERMEDIATE R&B

## 2025-01-07 PROCEDURE — 80048 BASIC METABOLIC PNL TOTAL CA: CPT

## 2025-01-07 PROCEDURE — 6370000000 HC RX 637 (ALT 250 FOR IP)

## 2025-01-07 PROCEDURE — 99231 SBSQ HOSP IP/OBS SF/LOW 25: CPT | Performed by: NURSE PRACTITIONER

## 2025-01-07 PROCEDURE — 97530 THERAPEUTIC ACTIVITIES: CPT

## 2025-01-07 PROCEDURE — 6370000000 HC RX 637 (ALT 250 FOR IP): Performed by: INTERNAL MEDICINE

## 2025-01-07 PROCEDURE — 97110 THERAPEUTIC EXERCISES: CPT

## 2025-01-07 PROCEDURE — 97116 GAIT TRAINING THERAPY: CPT

## 2025-01-07 RX ADMIN — SODIUM CHLORIDE, PRESERVATIVE FREE 10 ML: 5 INJECTION INTRAVENOUS at 08:07

## 2025-01-07 RX ADMIN — CHOLECALCIFEROL TAB 125 MCG (5000 UNIT) 5000 UNITS: 125 TAB at 08:06

## 2025-01-07 RX ADMIN — Medication 1 G: at 17:13

## 2025-01-07 RX ADMIN — SODIUM CHLORIDE, PRESERVATIVE FREE 10 ML: 5 INJECTION INTRAVENOUS at 20:09

## 2025-01-07 RX ADMIN — Medication 1 G: at 12:38

## 2025-01-07 RX ADMIN — ENOXAPARIN SODIUM 40 MG: 100 INJECTION SUBCUTANEOUS at 08:06

## 2025-01-07 RX ADMIN — MULTIVITAMIN TABLET 1 TABLET: TABLET at 08:05

## 2025-01-07 RX ADMIN — Medication 1 G: at 08:06

## 2025-01-07 ASSESSMENT — PAIN SCALES - GENERAL
PAINLEVEL_OUTOF10: 0

## 2025-01-07 NOTE — PLAN OF CARE
Problem: Discharge Planning  Goal: Discharge to home or other facility with appropriate resources  1/6/2025 2104 by Scotty Richardson, RN  Outcome: Progressing  Flowsheets (Taken 1/6/2025 2000)  Discharge to home or other facility with appropriate resources:   Identify barriers to discharge with patient and caregiver   Arrange for needed discharge resources and transportation as appropriate   Identify discharge learning needs (meds, wound care, etc)   Arrange for interpreters to assist at discharge as needed   Refer to discharge planning if patient needs post-hospital services based on physician order or complex needs related to functional status, cognitive ability or social support system  Note: Discharge teaching and instructions for diagnosis/procedure explained with patient using teachback method. Patient voiced understanding regarding prescriptions, follow up appointments, and care of self at home.   1/6/2025 1014 by Monique Escobar RN  Outcome: Progressing     Problem: Safety - Adult  Goal: Free from fall injury  1/6/2025 2104 by Scotty Richardson, RN  Outcome: Progressing  Note: Pt fall risk, fall band present, falling star, safety alarm activated and in use as needed. Hourly rounding performed. Pt encouraged to use call light. See [facility] fall risk assessment.  1/6/2025 1014 by Monique Escobar RN  Outcome: Progressing     Problem: Skin/Tissue Integrity - Adult  Goal: Skin integrity remains intact  1/6/2025 2104 by Scotty Richardson, RN  Outcome: Progressing  Flowsheets (Taken 1/6/2025 2000)  Skin Integrity Remains Intact:   Monitor for areas of redness and/or skin breakdown   Assess vascular access sites hourly   Every 4-6 hours minimum: Change oxygen saturation probe site   Every 4-6 hours: If on nasal continuous positive airway pressure, respiratory therapy assesses nares and determine need for appliance change or resting period  Note: Continuing to monitor for skin integrity risks. Patient

## 2025-01-07 NOTE — PLAN OF CARE
Aox4  RA  1A walker    Problem: Discharge Planning  Goal: Discharge to home or other facility with appropriate resources  1/7/2025 1645 by Patricia Dumont RN  Outcome: Progressing     Problem: Safety - Adult  Goal: Free from fall injury  1/7/2025 1645 by Patricia Dumont RN  Outcome: Progressing     Problem: Skin/Tissue Integrity - Adult  Goal: Skin integrity remains intact  1/7/2025 1645 by Patricia Dumont RN  Outcome: Progressing     Problem: Skin/Tissue Integrity - Adult  Goal: Incisions, wounds, or drain sites healing without S/S of infection  1/7/2025 1645 by Patricia Dumont RN  Outcome: Progressing     Problem: Skin/Tissue Integrity - Adult  Goal: Oral mucous membranes remain intact  1/7/2025 1645 by Patricia Dumont RN  Outcome: Progressing     Problem: Musculoskeletal - Adult  Goal: Return mobility to safest level of function  1/7/2025 1645 by Patricia Dumont RN  Outcome: Progressing     Problem: Musculoskeletal - Adult  Goal: Maintain proper alignment of affected body part  1/7/2025 1645 by Patricia Dumont RN  Outcome: Progressing     Problem: Musculoskeletal - Adult  Goal: Return ADL status to a safe level of function  1/7/2025 1645 by Patricia Dumont RN  Outcome: Progressing     Problem: Genitourinary - Adult  Goal: Absence of urinary retention  1/7/2025 1645 by Patricia Dumont RN  Outcome: Progressing     Problem: Metabolic/Fluid and Electrolytes - Adult  Goal: Electrolytes maintained within normal limits  1/7/2025 1645 by Patricia Dumont RN  Outcome: Progressing

## 2025-01-07 NOTE — PLAN OF CARE
Aox4  RA  Ambulates 1A with a walker  PO salt tabs TID for hyponatremia  Precert pending  Worked with PT/OT today, up to chair intermittently  Family at bedside throughout the day, plan of care reviewed and all questions answered  Bed alarm is on, bed locked and in the lowest position, call light within reach, and safety maintained      Problem: Discharge Planning  Goal: Discharge to home or other facility with appropriate resources  1/7/2025 0714 by Patricia Dumont RN  Outcome: Progressing     Problem: Safety - Adult  Goal: Free from fall injury  1/7/2025 0714 by Patricia Dumont RN  Outcome: Progressing     Problem: Skin/Tissue Integrity - Adult  Goal: Skin integrity remains intact  1/7/2025 0714 by Patricia Dumont RN  Outcome: Progressing     Problem: Skin/Tissue Integrity - Adult  Goal: Incisions, wounds, or drain sites healing without S/S of infection  1/7/2025 0714 by Patricia Dumont RN  Outcome: Progressing     Problem: Skin/Tissue Integrity - Adult  Goal: Oral mucous membranes remain intact  1/7/2025 0714 by Patricia Dumont RN  Outcome: Progressing     Problem: Musculoskeletal - Adult  Goal: Return mobility to safest level of function  1/7/2025 0714 by Patricia Dumont RN  Outcome: Progressing     Problem: Musculoskeletal - Adult  Goal: Maintain proper alignment of affected body part  1/7/2025 0714 by Patricia Dumont RN  Outcome: Progressing     Problem: Musculoskeletal - Adult  Goal: Return ADL status to a safe level of function  1/7/2025 0714 by Patricia Dumont RN  Outcome: Progressing     Problem: Genitourinary - Adult  Goal: Absence of urinary retention  1/7/2025 0714 by Patricia Dumont RN  Outcome: Progressing     Problem: Metabolic/Fluid and Electrolytes - Adult  Goal: Electrolytes maintained within normal limits  1/7/2025 0714 by Patricia Dumont RN  Outcome: Progressing

## 2025-01-08 LAB
ANION GAP SERPL CALCULATED.3IONS-SCNC: 9 MMOL/L (ref 9–16)
BUN SERPL-MCNC: 20 MG/DL (ref 8–23)
CALCIUM SERPL-MCNC: 9.1 MG/DL (ref 8.8–10.2)
CHLORIDE SERPL-SCNC: 101 MMOL/L (ref 98–107)
CO2 SERPL-SCNC: 26 MMOL/L (ref 20–31)
CREAT SERPL-MCNC: 0.9 MG/DL (ref 0.7–1.2)
GFR, ESTIMATED: >90 ML/MIN/1.73M2
GLUCOSE SERPL-MCNC: 94 MG/DL (ref 82–115)
POTASSIUM SERPL-SCNC: 4.3 MMOL/L (ref 3.7–5.3)
SODIUM SERPL-SCNC: 136 MMOL/L (ref 136–145)

## 2025-01-08 PROCEDURE — 36415 COLL VENOUS BLD VENIPUNCTURE: CPT

## 2025-01-08 PROCEDURE — 2060000000 HC ICU INTERMEDIATE R&B

## 2025-01-08 PROCEDURE — 97535 SELF CARE MNGMENT TRAINING: CPT

## 2025-01-08 PROCEDURE — 97110 THERAPEUTIC EXERCISES: CPT

## 2025-01-08 PROCEDURE — 97116 GAIT TRAINING THERAPY: CPT

## 2025-01-08 PROCEDURE — 97530 THERAPEUTIC ACTIVITIES: CPT

## 2025-01-08 PROCEDURE — 2500000003 HC RX 250 WO HCPCS: Performed by: INTERNAL MEDICINE

## 2025-01-08 PROCEDURE — 6370000000 HC RX 637 (ALT 250 FOR IP): Performed by: INTERNAL MEDICINE

## 2025-01-08 PROCEDURE — 80048 BASIC METABOLIC PNL TOTAL CA: CPT

## 2025-01-08 PROCEDURE — 6360000002 HC RX W HCPCS: Performed by: INTERNAL MEDICINE

## 2025-01-08 PROCEDURE — 6370000000 HC RX 637 (ALT 250 FOR IP)

## 2025-01-08 PROCEDURE — 99238 HOSP IP/OBS DSCHRG MGMT 30/<: CPT | Performed by: NURSE PRACTITIONER

## 2025-01-08 RX ADMIN — ENOXAPARIN SODIUM 40 MG: 100 INJECTION SUBCUTANEOUS at 08:34

## 2025-01-08 RX ADMIN — SODIUM CHLORIDE, PRESERVATIVE FREE 10 ML: 5 INJECTION INTRAVENOUS at 22:03

## 2025-01-08 RX ADMIN — Medication 1 G: at 08:34

## 2025-01-08 RX ADMIN — MULTIVITAMIN TABLET 1 TABLET: TABLET at 08:34

## 2025-01-08 RX ADMIN — CHOLECALCIFEROL TAB 125 MCG (5000 UNIT) 5000 UNITS: 125 TAB at 08:34

## 2025-01-08 RX ADMIN — SODIUM CHLORIDE, PRESERVATIVE FREE 10 ML: 5 INJECTION INTRAVENOUS at 08:34

## 2025-01-08 ASSESSMENT — PAIN SCALES - GENERAL: PAINLEVEL_OUTOF10: 0

## 2025-01-08 NOTE — CARE COORDINATION
Social Work- Received call from [facility] . Patient is denied. The number for the expedited appeal is [phone] and the fax is [phone]

## 2025-01-08 NOTE — PLAN OF CARE
Problem: Discharge Planning  Goal: Discharge to home or other facility with appropriate resources  1/7/2025 1904 by Scotty Richardson, RN  Outcome: Progressing  Flowsheets (Taken 1/7/2025 1900)  Discharge to home or other facility with appropriate resources:   Identify barriers to discharge with patient and caregiver   Arrange for needed discharge resources and transportation as appropriate   Identify discharge learning needs (meds, wound care, etc)   Arrange for interpreters to assist at discharge as needed   Refer to discharge planning if patient needs post-hospital services based on physician order or complex needs related to functional status, cognitive ability or social support system  Note: Discharge teaching and instructions for diagnosis/procedure explained with patient using teachback method.  Patient voiced understanding regarding prescriptions, follow up appointments, and care of self at home.   1/7/2025 1645 by Patricia Dumont RN  Outcome: Progressing  1/7/2025 0714 by Patricia Dumont RN  Outcome: Progressing     Problem: Safety - Adult  Goal: Free from fall injury  1/7/2025 1904 by Scotty Richardson, RN  Outcome: Progressing  Note: Pt fall risk, fall band present, falling star, safety alarm activated and in use as needed. Hourly rounding performed. Pt encouraged to use call light. See Quynh fall risk assessment.  1/7/2025 1645 by Patricia Dumont RN  Outcome: Progressing  1/7/2025 0714 by Patricia Dumont RN  Outcome: Progressing     Problem: Skin/Tissue Integrity - Adult  Goal: Skin integrity remains intact  1/7/2025 1904 by Scotty Richardson, RN  Outcome: Progressing  Flowsheets (Taken 1/7/2025 1900)  Skin Integrity Remains Intact:   Monitor for areas of redness and/or skin breakdown   Assess vascular access sites hourly   Every 4-6 hours minimum: Change oxygen saturation probe site   Every 4-6 hours: If on nasal continuous positive airway pressure, respiratory therapy assesses nares and determine

## 2025-01-08 NOTE — CARE COORDINATION
Social Work-Spoke with patient. Discussed that a P2P was completed and they are recommending home health or out patient. Provided number for patient for expedited appeal. Patient would like  to contact [facility] in the morning regarding monthly cost. Moris

## 2025-01-08 NOTE — CARE COORDINATION
Discharge planning    Call to [facility] in [place] and message left with intake to call writer back to see if can accept.

## 2025-01-08 NOTE — CARE COORDINATION
Social Work-Received denial from [facility] Localler. Notified Dr Sharma. She is agreeable to complete P2P. Moris

## 2025-01-08 NOTE — DISCHARGE INSTR - COC
Continuity of Care Form    Patient Name: Manish Roblero   :  1955  MRN:  [id number]    Admit date:  2025  Discharge date:  25    Code Status Order: Full Code   Advance Directives:   Advance Care Flowsheet Documentation             Admitting Physician:  Cynthia Whyte MD  PCP: Rowdy Landis MD    Discharging Nurse: gilbert     Discharging Hospital Unit/Room#: 1001/1001-02  Discharging Unit Phone Number: [phone]    Emergency Contact:   Extended Emergency Contact Information  Primary Emergency Contact: Brian Roblero   [facility]  Home Phone: [phone]  Relation: Child    Past Surgical History:  Past Surgical History:   Procedure Laterality Date    COLONOSCOPY  2018    Tortuous sigmoid colon By Dr. Santos @ [facility]    COLONOSCOPY      normal    COLONOSCOPY      CYSTOSCOPY      WITH BLADDER BIOPSY    ESOPHAGOGASTRODUODENOSCOPY  2018    by Dr. Trotter @ [facility]    HEMORRHOID SURGERY      Thrombosed    HERNIA REPAIR Right     inguinal right with mesh; laparoscopic at [facility]    LYMPH NODE DISSECTION Left     AXILLARY- benign    OTHER SURGICAL HISTORY      PE tubes    SINUS SURGERY Bilateral 1994    TONSILLECTOMY AND ADENOIDECTOMY      TOOTH EXTRACTION  2017       Immunization History:   Immunization History   Administered Date(s) Administered    Hepatitis B 2001, 2001, 10/02/2002    TDaP, ADACEL (age 10y-64y), BOOSTRIX (age 10y+), IM, 0.5mL 2017       Active Problems:  Patient Active Problem List   Diagnosis Code    Plantar fascial fibromatosis R/L M72.2    Acquired deformity of toe M20.60    Depression F32.A    GERD with esophagitis K21.00    Primary osteoarthritis of wrist M19.039    Disorder of bursae and tendons in shoulder region M71.9, M67.919    Mixed hyperlipidemia E78.2    Hoarseness R49.0    Chronic prostatitis N41.1    Thrombosed hemorrhoids K64.5    Hyponatremia E87.1    Interstitial cystitis N30.10

## 2025-01-08 NOTE — CARE COORDINATION
Social Work- Dr Sharma completed P2P and patient was not approved.  is checking on home care. Moris

## 2025-01-08 NOTE — CARE COORDINATION
Discharge planning     Call back from P and cannot accept.     Call [facility]  [phone]  and they can look at referral. Fax to [phone]    Faxed over therapy notes and face sheet to them     Called the only other 2 in his network in [place] and [facility] and [facility] [place] both have their phone disconnected    Did also sent to [address], [facility] to evaluate    1415  Call to [facility] and spoke with Deandra. Transferred to intake. Left . They are requesting home care order. Perfect serve to attending to complete. One have HC order will need to fax to [facility]

## 2025-01-08 NOTE — PLAN OF CARE
D/c plan precert to [facility] pending, otherwise Home with HC, pt concerned that he cannot go home does not feel able to care for self. Pt remains A&O x 4, on RA. No new s/s of skin breakdown or injury. Safety protocols in place and enforced. Pt ambulates SB with walker. Pt has no c/o pain.     Pt okay to be d/c'd from urology stand point, if pt still here Friday will do cysto but this can be done outpt and does not need to hold up d/c.     Problem: Discharge Planning  Goal: Discharge to home or other facility with appropriate resources  1/8/2025 0852 by Jayleen Schwartz RN  Outcome: Progressing     Problem: Safety - Adult  Goal: Free from fall injury  1/8/2025 0852 by Jayleen Schwartz RN  Outcome: Progressing     Problem: Skin/Tissue Integrity - Adult  Goal: Skin integrity remains intact  1/8/2025 0852 by Jayleen Schwartz RN  Outcome: Progressing     Problem: Skin/Tissue Integrity - Adult  Goal: Incisions, wounds, or drain sites healing without S/S of infection  1/8/2025 0852 by Jayleen Schwartz RN  Outcome: Progressing     Problem: Skin/Tissue Integrity - Adult  Goal: Oral mucous membranes remain intact  1/8/2025 0852 by Jayleen Schwartz RN  Outcome: Progressing     Problem: Musculoskeletal - Adult  Goal: Return mobility to safest level of function  1/8/2025 0852 by Jayleen Schwartz RN  Outcome: Progressing     Problem: Musculoskeletal - Adult  Goal: Maintain proper alignment of affected body part  1/8/2025 0852 by Jayleen Schwartz RN  Outcome: Progressing     Problem: Musculoskeletal - Adult  Goal: Return ADL status to a safe level of function  1/8/2025 0852 by Jayleen Schwartz RN  Outcome: Progressing     Problem: Genitourinary - Adult  Goal: Absence of urinary retention  1/8/2025 0852 by Jayleen Schwartz RN  Outcome: Progressing     Problem: Metabolic/Fluid and Electrolytes - Adult  Goal: Electrolytes maintained within normal limits  1/8/2025 0852 by Jayleen Schwartz RN  Outcome: Progressing

## 2025-01-08 NOTE — DISCHARGE SUMMARY
[facility]  Office: [phone]  Thien Myers DO, Maximo Burns DO, Bk Palacios DO, Anson Yung DO, Mikki Gauthier MD, Funmi Resendiz MD, Cynthia Whyte MD, Donita Pascal MD,  Cam Massey MD, Teodoro Cochran MD, Nataly Cerda MD,  Any Dhillon DO, Gama Calero MD, Jamison Orr MD, Augustine Myers DO, Idalia Ching MD,  Edmund Yee DO, Virginia Adames MD, Ivet Temple MD, Delfina Vidales MD, Chiara Mann MD,  Joseph Lee MD, Noah Heredia MD, Montana Vazquez MD, Celia Gonsalves MD, Gómez Riley MD, Iris Akbar MD, Juaquin Orona DO, Chidi Mallory MD, Any Chapa MD, Mohsin Reza, MD, Shirley Waterhouse, CNP,  Mignon Ibarra CNP, Juaquin Colón, ERASTO,  Tiff Fletcher, TO, Alida Keene, CNP, Monet Strong, CNP, Karen Bains CNP, Katie Stringer, CNP, Jacqueline Mckeon, PA-C, Estella Brooks PA-C, Meagan Grace, CNP, Sana Maldonado, CNP,  Mallory You, CNP, Maribel Freeman, CNP, Jayleen Velazquez, ERASTO,  Gail Triplett CNP, Leigh Gleason, CNP         [facility]   IN-PATIENT SERVICE   [facility]    Discharge Summary     Patient ID: Manish Roblero  :  1955   MRN: [id number]     ACCOUNT:  [id number]   Patient's PCP: Rowdy Landis MD  Admit Date: 2025   Discharge Date: 2025  Length of Stay: 4  Code Status:  Full Code  Admitting Physician: Cynthia Whyte MD  Discharge Physician: PING Johnson - CNP     Active Discharge Diagnoses:     Hospital Problem Lists:  Principal Problem:    Hyponatremia  Active Problems:    GERD with esophagitis    Mixed hyperlipidemia    Interstitial cystitis    Hyperbilirubinemia  Resolved Problems:    * No resolved hospital problems. *      Admission Condition:  fair     Discharged Condition: stable    Hospital Stay:     Hospital Course:  This is a 72-year-old male that presents with a complaint of weakness and was found to have hyponatremia.  Approximately week ago he was initially on Wellbutrin

## 2025-01-09 VITALS
HEIGHT: 73 IN | WEIGHT: 152.56 LBS | BODY MASS INDEX: 20.22 KG/M2 | SYSTOLIC BLOOD PRESSURE: 112 MMHG | DIASTOLIC BLOOD PRESSURE: 64 MMHG | RESPIRATION RATE: 18 BRPM | OXYGEN SATURATION: 98 % | TEMPERATURE: 97.5 F | HEART RATE: 94 BPM

## 2025-01-09 PROCEDURE — 6360000002 HC RX W HCPCS: Performed by: INTERNAL MEDICINE

## 2025-01-09 PROCEDURE — 6370000000 HC RX 637 (ALT 250 FOR IP): Performed by: FAMILY MEDICINE

## 2025-01-09 PROCEDURE — 6370000000 HC RX 637 (ALT 250 FOR IP): Performed by: INTERNAL MEDICINE

## 2025-01-09 PROCEDURE — 99231 SBSQ HOSP IP/OBS SF/LOW 25: CPT | Performed by: NURSE PRACTITIONER

## 2025-01-09 RX ORDER — KETOTIFEN FUMARATE 0.35 MG/ML
1 SOLUTION/ DROPS OPHTHALMIC 2 TIMES DAILY
Status: DISCONTINUED | OUTPATIENT
Start: 2025-01-09 | End: 2025-01-09 | Stop reason: HOSPADM

## 2025-01-09 RX ORDER — ASPIRIN 81 MG/1
81 TABLET, CHEWABLE ORAL EVERY OTHER DAY
Status: DISCONTINUED | OUTPATIENT
Start: 2025-01-09 | End: 2025-01-09 | Stop reason: HOSPADM

## 2025-01-09 RX ORDER — KETOTIFEN FUMARATE 0.35 MG/ML
1 SOLUTION/ DROPS OPHTHALMIC 2 TIMES DAILY
DISCHARGE
Start: 2025-01-09 | End: 2025-01-19

## 2025-01-09 RX ADMIN — ENOXAPARIN SODIUM 40 MG: 100 INJECTION SUBCUTANEOUS at 07:37

## 2025-01-09 RX ADMIN — CHOLECALCIFEROL TAB 125 MCG (5000 UNIT) 5000 UNITS: 125 TAB at 07:37

## 2025-01-09 RX ADMIN — ASPIRIN 81 MG: 81 TABLET, CHEWABLE ORAL at 11:08

## 2025-01-09 RX ADMIN — KETOTIFEN FUMARATE 1 DROP: 0.25 SOLUTION/ DROPS OPHTHALMIC at 11:09

## 2025-01-09 RX ADMIN — MULTIVITAMIN TABLET 1 TABLET: TABLET at 07:37

## 2025-01-09 NOTE — PROGRESS NOTES
Rufino Soria MD   Urology Progress Note            Subjective:  follow-up enlarged prostate with a symptoms of prostatism    Patient Vitals for the past 24 hrs:   BP Temp Temp src Pulse Resp SpO2   01/08/25 0302 (!) 98/57 97.9 °F (36.6 °C) Oral 72 18 95 %   01/07/25 2342 111/63 98.2 °F (36.8 °C) Oral 69 18 97 %   01/07/25 2007 114/74 97.7 °F (36.5 °C) Oral 76 18 97 %   01/07/25 1630 130/77 -- -- 86 -- --   01/07/25 1529 (!) 97/57 97.7 °F (36.5 °C) Oral 82 18 100 %   01/07/25 1159 92/61 97.7 °F (36.5 °C) Oral 81 18 97 %   01/07/25 0740 112/69 98.1 °F (36.7 °C) Oral 75 18 95 %       Intake/Output Summary (Last 24 hours) at 1/8/2025 0503  Last data filed at 1/7/2025 1958  Gross per 24 hour   Intake 720 ml   Output 550 ml   Net 170 ml       Recent Labs     01/05/25  0634 01/06/25  0612 01/07/25  0501   WBC 6.7 7.0 7.1   HGB 13.4 14.1 12.9*   HCT 36.4* 37.8* 36.3*   MCV 87.1 89.8 89.9    343 310     Recent Labs     01/05/25  0634 01/05/25  1208 01/05/25  2345 01/06/25  0612 01/07/25  0501   *   < > 129* 129* 134*   K 4.0   < > 4.5 4.1 4.1   CL 93*   < > 95* 96* 100   CO2 23   < > 22 25 24   BUN 13  --   --  15 20   CREATININE 0.9  --   --  0.9 0.9    < > = values in this interval not displayed.       Recent Labs     01/06/25  0830   COLORU Yellow   PHUR 7.0   WBCUA None   RBCUA 2 TO 5   MUCUS 1+*   LEUKOCYTESUR NEGATIVE   UROBILINOGEN Normal   BILIRUBINUR NEGATIVE       Additional Lab/culture results:    Physical Exam:  patient had acute distress overall improved    Interval Imaging Findings:    Impression:    Patient Active Problem List   Diagnosis    Plantar fascial fibromatosis R/L    Acquired deformity of toe    Depression    GERD with esophagitis    Primary osteoarthritis of wrist    Disorder of bursae and tendons in shoulder region    Mixed hyperlipidemia    Hoarseness    Chronic prostatitis    Thrombosed hemorrhoids    Hyponatremia    Interstitial cystitis    
                                Rufino Soria MD   Urology Progress Note            Subjective: Follow-up enlarged prostate, lower urinary tract symptoms    Patient Vitals for the past 24 hrs:   BP Temp Temp src Pulse Resp SpO2   01/09/25 0316 115/70 98.1 °F (36.7 °C) Oral 72 16 95 %   01/08/25 2359 102/73 98.2 °F (36.8 °C) Oral 71 18 97 %   01/08/25 1943 112/68 98.2 °F (36.8 °C) Oral 81 18 95 %   01/08/25 1524 109/60 98.1 °F (36.7 °C) Oral 87 18 97 %   01/08/25 1104 108/66 98.1 °F (36.7 °C) Oral 80 17 94 %   01/08/25 0717 104/60 98.1 °F (36.7 °C) Oral 74 17 96 %       Intake/Output Summary (Last 24 hours) at 1/9/2025 0715  Last data filed at 1/9/2025 0538  Gross per 24 hour   Intake 540 ml   Output 150 ml   Net 390 ml       Recent Labs     01/07/25  0501   WBC 7.1   HGB 12.9*   HCT 36.3*   MCV 89.9        Recent Labs     01/07/25  0501 01/08/25  0526   * 136   K 4.1 4.3    101   CO2 24 26   BUN 20 20   CREATININE 0.9 0.9       Recent Labs     01/06/25  0830   COLORU Yellow   PHUR 7.0   WBCUA None   RBCUA 2 TO 5   MUCUS 1+*   LEUKOCYTESUR NEGATIVE   UROBILINOGEN Normal   BILIRUBINUR NEGATIVE       Additional Lab/culture results:    Physical Exam: Patient gradually improving, voiding well, no gross hematuria, office follow-up after discharge discussed with the patient appointment will be made accordingly, monitor postvoid residual    Interval Imaging Findings:    Impression:    Patient Active Problem List   Diagnosis    Plantar fascial fibromatosis R/L    Acquired deformity of toe    Depression    GERD with esophagitis    Primary osteoarthritis of wrist    Disorder of bursae and tendons in shoulder region    Mixed hyperlipidemia    Hoarseness    Chronic prostatitis    Thrombosed hemorrhoids    Hyponatremia    Interstitial cystitis    Hyperbilirubinemia       Plan:  agree with the discharge planning with a follow-up with Dr.Omar Sammie Soria MD  7:15 AM 1/9/2025  
Discharge information given and explained to pt and family . Pt verbalized understanding. Pt discharged home with all documented belongings, to go to SNF via private vehicle     
Discharge planning    Call to interim health care to notify of discharge to [facility].   
[facility]  Office: [phone]  Thien Myers DO, Maximo Burns DO, Bk Palacios DO, Anson Yung DO, Mikki Gauthier MD, Funmi Resendiz MD, Cynthia Whyte MD, Donita Pascal MD,  Cam Massey MD, Teodoro Cochran MD, Nataly Cerda MD,  Any Dhillon DO, Gama Calero MD, Jamison Orr MD, Augustine Myers DO, Idalia Ching MD,  Edmund Yee DO, Virginia Adames MD, Ivet Temple MD, Delfina Vidales MD, Chiara Mann MD,  Joseph Lee MD, Noah Heredia MD, Montana Vazquez MD, Celia Gonsalves MD, Gómez Riley MD, Iris Akbar MD, Juaquin Orona DO, Chidi Mallory MD, Any Chapa MD, Mohsin Reza, MD, Shirley Waterhouse, CNP,  Mignon Ibarra CNP, Juaquin Colón, CNP,  Tiff Fletcher, TO, Alida Keene, CNP, Monet Strong, CNP, Karen Bains, CNP, Katie Stringer, CNP, Jacqueline Mckeon, PA-C, Estella Brooks PA-C, Meagan Grace, CNP, Sana Maldonado, CNP,  Mallory You, CNP, Maribel Freeman, CNP, Jayleen Velazquez, CNP,  Gail Triplett, CNP, Leigh Gleason, CNP         [facility]   IN-PATIENT SERVICE   [facility]    Progress Note    1/9/2025    9:14 AM    Name:   Manish Roblero  MRN:     [id number]     Acct:      [id number]   Room:   1001/1001-02  IP Day:  5  Admit Date:  1/4/2025  1:18 PM    PCP:   Rowdy Landis MD  Code Status:  Full Code    Subjective:     C/C: anxiety   Interval History Status: not changed.     Medically he is ready for discharge but he remains anxious about his discharge     Brief History:     This is a 72-year-old male that presents with a complaint of weakness and was found to have hyponatremia.  Approximately week ago he was initially on Wellbutrin therapy for depression symptoms.  Around that same time he has been having exacerbation of his interstitial cystitis he was referred to the urology with appointment scheduled for February.  His PCP advised him to push oral fluids to help with cystitis symptoms.  Entire evaluation emergency room 
Nephrology Progress Note      SUBJECTIVE       Pt was seen and examined. No acute issues overnite. Stable hemodynamics .    Patient is awake and alert.  No acute problems.  Patient without nausea, vomiting or fever.  Blood pressures are in the low 100s.  Urine output has been decent.  Serum sodium level is up to 129 today.  Urine studies indicate more often SIADH picture.  SSRI has been discontinued and not been restarted.      OBJECTIVE      CURRENT TEMPERATURE:  Temp: 98.1 °F (36.7 °C)  MAXIMUM TEMPERATURE OVER 24HRS:  Temp (24hrs), Av.8 °F (36.6 °C), Min:97.2 °F (36.2 °C), Max:98.1 °F (36.7 °C)    CURRENT RESPIRATORY RATE:  Respirations: 16  CURRENT PULSE:  Pulse: 69  CURRENT BLOOD PRESSURE:  BP: (!) 100/41  24HR BLOOD PRESSURE RANGE:  Systolic (24hrs), Av , Min:90 , Max:106   ; Diastolic (24hrs), Av, Min:41, Max:70    24HR INTAKE/OUTPUT:    Intake/Output Summary (Last 24 hours) at 2025 0930  Last data filed at 2025 0812  Gross per 24 hour   Intake 480 ml   Output 1200 ml   Net -720 ml     WEIGHT :Patient Vitals for the past 96 hrs (Last 3 readings):   Weight   25 0100 69.2 kg (152 lb 8.9 oz)   25 1330 69.4 kg (153 lb)     PHYSICAL EXAM      GENERAL APPEARANCE:Awake, alert, in no acute distress  SKIN: warm and dry, no rash or erythema  EYES: conjunctivae normal and sclera anicteric  ENT: no thrush no pharyngeal congestion   NECK:   No JVD. No carotid bruits and no carotid lymphadenopathy .  PULMONARY: lungs are clear on auscultation. No Wheezing, no ronchi .   CADRDIOVASCULAR: S1 and S2 normal NO S3 and NO S4 . No rubs , no murmur.   ABDOMEN: soft nontender, bowel sounds present, no organomegaly, no ascites.       EXTREMITIES: no cyanosis, clubbing or edema     CURRENT MEDICATIONS      sodium chloride tablet 1 g, TID WC  aspirin chewable tablet 81 mg, Every Other Day  multivitamin 1 tablet, Daily  omega-3 acid ethyl esters (LOVAZA) capsule 1 capsule, Daily  pantoprazole (PROTONIX) 
Occupational Therapy  Facility/Department: [facility] PROGRESSIVE CARE  Daily Treatment Note  NAME: Manish Roblero  : 1955  MRN: [id number]    Date of Service: 2025    RN reports patient is medically stable for therapy treatment this date.    Chart reviewed prior to treatment and patient is agreeable for therapy.  All lines intact and patient positioned comfortably at end of treatment.  All patient needs addressed prior to ending therapy session.      Discharge Recommendations:  Patient would benefit from continued therapy after discharge  OT Equipment Recommendations  Equipment Needed:  (CTA)    Patient Diagnosis(es): The primary encounter diagnosis was Hyponatremia. A diagnosis of Generalized weakness was also pertinent to this visit.     Assessment   Assessment: Pt tolerating session fair this date. Pt completing mobility with SBA with RW. Pt completing toileting with SBA for transfer and clothing management. Pt completing BUE HEP this date seated EOB to challenge core/trunk stability and BUE endurance, strength, and activity tolerance. Pt fixating on d/c planning this date needing cues to remain on task. Pt would benefit from continued acute care OT to address safety concerns, endurance, and ADLs/IADLs.  Activity Tolerance: Patient limited by fatigue  Discharge Recommendations: Patient would benefit from continued therapy after discharge  Equipment Needed:  (CTA)     Plan  Occupational Therapy Plan  Times Per Week: 4-5x/week  Current Treatment Recommendations: Strengthening;Balance training;Functional mobility training;Safety education & training;Self-Care / ADL;Endurance training;Patient/Caregiver education & training;Equipment evaluation, education, & procurement;Positioning;Cognitive/Perceptual training    Restrictions  Restrictions/Precautions  Restrictions/Precautions: General Precautions;Fall Risk  Activity Level: Up as Tolerated  Required Braces or Orthoses?: No  Position Activity Restriction  Other 
Patient had a relatively uneventful evening.  Patient assisted with walker & standby assist to bathroom to void.  Awaiting [facility] precert.  Side rails x2 raised & bed alarm activated for patient safety.  
Patient had small, formed bowel movement in bathroom.  Patient up assist x1 with walker.  Patient states he experiences some numbness in tips of toes on both feet.  Alert & oriented x4.  Sides rails x2 raised for patient safety.  Precertification pending at [facility] [place].    Patient verbalizes concern about going home with home care if denied by [facility], stating he is not ready to go home, & he can't take care of himself, and wishes to talk with Dr. Whyte in morning.  
Patient had uneventful evening.  Patient up with assist x1 with walker to bathroom.  Patient is alert & oriented x4.  Side rails x2 raised & bed alarm activated for patient safety.  Patient expresses concern over denied by [facility] [place], stating he doesn't feel he is ready to go home yet, even with home care.  Awaiting approval of home care company.  
Pharmacy Medication Review    The patient's list of current home medications has been reviewed.     PHYSICIANS AND NURSE PRACTITIONERS: please note there is no Transitions of Care/Med Rec Pharmacist available to address any discrepancies on inpatient orders. It is the responsibility of the attending provider to review the updates made to the home med list and adjust inpatient orders as appropriate.        Source(s) of information:patient, Surescripts refill report        Based on information provided by the above source(s), I have updated the patient's home med list as described below.     Removed   predniSONE (DELTASONE) 20 MG tablet   buPROPion (WELLBUTRIN XL) 150 MG er tablet   pantoprazole (PROTONIX) 40 MG tablet   MISC NATURAL PRODUCTS PO   meloxicam (MOBIC) 15 MG tablet   ibuprofen (ADVIL;MOTRIN) 200 MG tablet   Omega-3 Fatty Acids (FISH OIL) 1000 MG capsule      Added None      Adjusted   Multiple Vitamin (MULTI-VITAMIN DAILY PO)     Other notes:   None    Are any of the medications noted above considered a 'high alert' medication? no      Is the patient on warfarin at home? No          Please feel free to call me with any questions about this encounter. Thank you.      Reuben Stauffer, pharmacy technician  [facility]  Phone:  [phone]      Electronically signed by Reuben Stauffer on 1/7/2025 at 6:00 PM   Note: co-signature by the pharmacist only acknowledges that I have performed a medication review and does not attest to an evaluation of this medication review.      Prior to Admission medications    Medication Sig   Multiple Vitamin (MULTI-VITAMIN DAILY PO) Take 1 tablet by mouth daily   aspirin 81 MG chewable tablet Take 1 tablet by mouth every other day   vitamin D (CHOLECALCIFEROL) 125 MCG (5000 UT) CAPS capsule Take 1 capsule by mouth daily               
Physical Therapy  Facility/Department: [facility] CARE  Rehabilitation Physical Therapy Treatment Note    NAME: Manish Roblero  : 1955 (69 y.o.)  MRN: [id number]  CODE STATUS: Full Code    Date of Service: 25       Restrictions:  Restrictions/Precautions: General Precautions, Fall Risk  Position Activity Restriction  Other Position/Activity Restrictions: RUE IV, telemetry     SUBJECTIVE  Subjective: pt in bed agreeable to PT in bed upon arrival RN ok's PT       OBJECTIVE  Cognition  Overall Cognitive Status: Exceptions  Arousal/Alertness: Appropriate responses to stimuli  Following Commands: Follows multistep commands with increased time  Attention Span: Attends with cues to redirect  Safety Judgement: Decreased awareness of need for safety  Problem Solving: Decreased awareness of errors  Insights: Decreased awareness of deficits  Initiation: Requires cues for some  Sequencing: Requires cues for some  Orientation  Overall Orientation Status: Within Functional Limits    Functional Mobility  Bed Mobility  Overall Assistance Level: independent  Additional Factors: Verbal cues  Bridging  Assistance Level: independent  Roll Left  Assistance Level: independent  Supine to Sit  Assistance Level: independent  Scooting  Assistance Level: indepenedent  Transfers  Surface: From bed;To chair with arms  Additional Factors: Verbal cues;Hand placement cues  Device: Walker  Sit to Stand  Assistance Level: SBA/Contact guard assist  Stand to Sit  Assistance Level: SBA/Contact guard assist  Bed To/From Chair  Technique: Stand step;Stand pivot  Assistance Level: SBA/Contact guard assist;Stand by assist  Stand Pivot  Assistance Level: Stand by assist;Contact guard assist      Environmental Mobility  Ambulation  Surface: Level surface  Device: Rolling walker  Distance: 80 ft x 3, three standing rest breaks needed due to slight fatigue,and to reposition feet for a wider stance    verbal cues needed for upright posture and for 
Physical Therapy  Facility/Department: [facility] CARE  Physical Therapy Initial Assessment    Name: Manish Roblero  : 1955  MRN: [id number]  Date of Service: 2025  KELSY Dutta reports patient is medically stable for therapy treatment this date.    Chart reviewed prior to treatment and patient is agreeable for therapy.  All lines intact and patient positioned comfortably at end of treatment.  All patient needs addressed prior to ending therapy session.      Discharge Recommendations:  Patient would benefit from continued therapy after discharge   Pt presenting with new musculoskeletal dysfunction and would benefit from additional therapy at time of discharge.  Please refer to the AM-PAC score for current functional status.     Per H&P: \"This is a 69-year-old male with a significant past medical history of hyperlipidemia, GERD, who initially presented to [facility] facility with a chief complaint of weakness with difficulty ambulating and tremors admits to associated suprapubic pain, dysuria, frequency with chronic interstitial cystitis.  Denied any fevers, chills, chest pain, shortness of breath, cough, diarrhea or constipation.  Patient states he has been dealing with weakness for the past few months with intermittent tremors however over the past week symptoms have progressively worsened and his weakness and tremors progressed.  States he was recently started on Wellbutrin within the past week as well as instructed to increase his volume intake.  States over the past few days he drank at least six 16 ounce glasses of water (96 ounces daily) stating he was told to drink at least half his body weight in water a day due to his interstitial cystitis.  He presented to [facility] facility with weakness and found to be moderately hypotensive with mild symptoms.\"    Past Medical History:  has a past medical history of Acquired deformity of toe, Chronic prostatitis, Disorder of bursae and tendons in shoulder region, 
Physical Therapy  Facility/Department: [facility] CARE   Physical Therapy Daily Treatment Note    Patient Name: Manish Roblero        MRN: [id number]    : 1955    Date of Service: 2025  RN reports patient is medically stable for therapy treatment this date.    Chart reviewed prior to treatment and patient is agreeable for therapy.  All lines intact and patient positioned comfortably at end of treatment.  All patient needs addressed prior to ending therapy session.      Past Medical History:  has a past medical history of Acquired deformity of toe, Chronic prostatitis, Disorder of bursae and tendons in shoulder region, GERD with esophagitis, Hoarseness, Mixed hyperlipidemia, Primary osteoarthritis of wrist, Reflux esophagitis, and Thrombosed hemorrhoids.  Past Surgical History:  has a past surgical history that includes Cystocopy; lymph node dissection (Left); Colonoscopy (2018); sinus surgery (Bilateral, 1994); Esophagogastroduodenoscopy (2018); Colonoscopy (); Colonoscopy (); Hemorrhoid surgery (); other surgical history; Tonsillectomy and adenoidectomy; Tooth Extraction (2017); and hernia repair (Right, ).    Discharge Recommendations  Discharge Recommendations: Patient would benefit from continued therapy after discharge  Pt presenting with new musculoskeletal dysfunction and would benefit from additional therapy at time of discharge.  Please refer to the AM-PAC score for current functional status.     Assessment  Body Structures, Functions, Activity Limitations Requiring Skilled Therapeutic Intervention: Decreased functional mobility ;Decreased safe awareness;Decreased strength;Decreased cognition;Decreased endurance;Decreased balance;Decreased high-level IADLs  Assessment: Pt tolerated session well.  Pt demonstrates progress towards goals; pt most limited by fear of falling.  Pt would benefit from continued skilled PT to address deficits in order to maximize 
Physical Therapy  Facility/Department: [facility] CARE  Rehabilitation Physical Therapy Treatment Note    NAME: Manish Roblero  : 1955 (69 y.o.)  MRN: [id number]  CODE STATUS: Full Code    Date of Service: 25       Restrictions:  Restrictions/Precautions: General Precautions, Fall Risk  Position Activity Restriction  Other Position/Activity Restrictions: RUE IV, telemetry     SUBJECTIVE  Subjective: pt in bed agreeable to PT in bed upon arrival RN ok's PT       OBJECTIVE  Cognition  Overall Cognitive Status: Exceptions  Arousal/Alertness: Appropriate responses to stimuli  Following Commands: Follows multistep commands with increased time  Attention Span: Attends with cues to redirect  Memory: Appears intact  Safety Judgement: Decreased awareness of need for safety  Problem Solving: Decreased awareness of errors  Insights: Decreased awareness of deficits  Initiation: Requires cues for some  Sequencing: Requires cues for some  Orientation  Overall Orientation Status: Within Functional Limits    Functional Mobility  Bed Mobility  Overall Assistance Level: Stand By Assist  Additional Factors: Verbal cues  Bridging  Assistance Level: Stand by assist  Roll Left  Assistance Level: Stand by assist  Supine to Sit  Assistance Level: Stand by assist  Scooting  Assistance Level: Stand by assist  Transfers  Surface: From bed;To chair with arms  Additional Factors: Verbal cues;Hand placement cues  Device: Walker  Sit to Stand  Assistance Level: Contact guard assist  Stand to Sit  Assistance Level: Contact guard assist  Bed To/From Chair  Technique: Stand step;Stand pivot  Assistance Level: Contact guard assist;Stand by assist  Stand Pivot  Assistance Level: Stand by assist;Contact guard assist      Environmental Mobility  Ambulation  Surface: Level surface  Device: Rolling walker  Distance: 120 ft  Activity: Within Room;Within Unit  Additional Factors: Verbal cues;Hand placement cues  Assistance Level: Stand by 
[facility]  Office: [phone]  Thien Myers DO, Maximo Burns DO, Bk Palacios DO, Anson Yung DO, Mikki Gauthier MD, Funmi Resendiz MD, Cynthia Whyte MD, Donita Pascal MD,  Cam Massey MD, Teodoro Cochran MD, Nataly Cerda MD,  Any Dhillon DO, Gama Calero MD, Jamison Orr MD, Augustine Myers DO, Idalia Ching MD,  Edmund Yee DO, Virginia Adames MD, Ivet Temple MD, Delfina Vidales MD, Chiara Mann MD,  Joseph Lee MD, Noah Heredia MD, Montana Vazquez MD, Celia Gonsalves MD, Gómez Riley MD, Iris Akbar MD, Juaquin Orona DO, Chidi Mallory MD, Any Chapa MD, Mohsin Reza, MD, Shirley Waterhouse, CNP,  Mignon Ibarra CNP, Juaquin Colón, ERASTO,  Tiff Fletcher, TO, Alida Keene, CNP, Monet Strong, CNP, Karen Bains, CNP, Katie Stringer, CNP, Jacqueline Mckeon, PA-C, Estella Brooks PA-C, Meagan Grace, CNP, Sana Maldonado, CNP,  Mallory You, CNP, Maribel Freeman, CNP, Jayleen Velazquez, CNP,  Gail Triplett, ERASTO, Leigh Gleason, CNP         [facility]   IN-PATIENT SERVICE   [facility]    Progress Note    1/6/2025    8:51 AM    Name:   Manish Roblero  MRN:     [id number]     Acct:      [id number]   Room:   [facility]/1001-02   Day:  2  Admit Date:  1/4/2025  1:18 PM    PCP:   Rowdy Landis MD  Code Status:  Full Code    Subjective:     C/C: Weakness, bladder pain    Interval History Status: improved.     Patient is resting in bed, denies any complaints of chest pain, shortness of breath, nausea vomiting, fevers chills or acute complaints.  Complains of generalized weakness, feels he cannot manage at home.    Brief History:     This is a 72-year-old male that presents with a complaint of weakness and was found to have hyponatremia.  Approximately week ago he was initially on Wellbutrin therapy for depression symptoms.  Around that same time he has been having exacerbation of his interstitial cystitis he was referred to the urology with 
Pt had a restless night due to not being comfortable, repositioned multiples times with limited success on comfort. Sodium for midnight draw was 129. He was able to ambulate to the bathroom with stand by assistance , urinal output was 525mL. He remains alert and oriented X4, normal sinus on monitor and on room air. No complaints of pain, continues to have concerns of weakness.       Problem: Discharge Planning  Goal: Discharge to home or other facility with appropriate resources  Outcome: Progressing  Discharge to home or other facility with appropriate resources:   Identify barriers to discharge with patient and caregiver   Arrange for needed discharge resources and transportation as appropriate   Identify discharge learning needs (meds, wound care, etc)        Problem: Safety - Adult  Goal: Free from fall injury  Outcome: Progressing  Free From Fall Injury:   Instruct family/caregiver on patient safety   Based on caregiver fall risk screen, instruct family/caregiver to ask for assistance with transferring infant if caregiver noted to have fall risk factors        Problem: Skin/Tissue Integrity - Adult  Goal: Skin integrity remains intact  Outcome: Progressing  Skin Integrity Remains Intact:   Monitor for areas of redness and/or skin breakdown   Assess vascular access sites hourly   Every 4-6 hours minimum: Change oxygen saturation probe site        Problem: Genitourinary - Adult  Goal: Absence of urinary retention  Absence of urinary retention:   Assess patient’s ability to void and empty bladder   Monitor intake/output and perform bladder scan as needed  
[facility]  Office: [phone]  Thien Myers DO, Maximo Burns DO, Bk Palacios DO, Anson Yung DO, Mikki Gauthier MD, Funmi Resendiz MD, Cynthia Whyte MD, Donita Pascal MD,  Cam Massey MD, Teodoro Cochran MD, Nataly Cerda MD,  Any Dhillon DO, Gama Calero MD, Jamison Orr MD, Augustine Myers DO, Idalia Ching MD,  Edmund Yee DO, Virginia Adames MD, Ivet Temple MD, Delfina Vidales MD, Chiara Mann MD,  Joseph Lee MD, Noah Heredia MD, Montana Vazquez MD, Celia Gonsalves MD, Gómez Riley MD, Iris Akbar MD, Juaquin Orona DO, Chidi Mallory MD, Any Chapa MD, Mohsin Reza, MD, Shirley Waterhouse, CNP,  Mignon Ibarra CNP, Juaquin Colón, ERASTO,  Tiff Fletchre, TO, Alida Keene, CNP, Monet Strong, CNP, Karen Bains, CNP, Katie Stringer, CNP, Jacqueline Mckeon, PA-C, Estella Brooks PA-C, Meagan Grace, CNP, Sana Maldonado, CNP,  Mallory You, CNP, Maribel Freeman, CNP, Jayleen Velazquez, CNP,  Gail Triplett, ERASTO, Leigh Gleason, CNP         [facility]   IN-PATIENT SERVICE   [facility]    Progress Note    1/5/2025    10:54 AM    Name:   Manish Roblero  MRN:     [id number]     Acct:      [id number]   Room:   1001/1001-02   Day:  1  Admit Date:  1/4/2025  1:18 PM    PCP:   Rowdy Landis MD  Code Status:  Full Code    Subjective:     C/C: Weakness, bladder pain    Interval History Status: improved.     Patient feels slightly better today, denies any complaints of chest pain, shortness of breath, nausea or vomiting, fevers or chills.    Brief History:     This is a 72-year-old male that presents with a complaint of weakness and was found to have hyponatremia.  Approximately week ago he was initially on Wellbutrin therapy for depression symptoms.  Around that same time he has been having exacerbation of his interstitial cystitis he was referred to the urology with appointment scheduled for February.  His PCP advised him to push oral 
Spiritual Health History and Assessment/Progress Note  [facility]    (P) Spiritual/Emotional Needs,  ,  ,      Name: Manish Roblero MRN: [id number]    Age: 69 y.o.     Sex: male   Language: English   Judaism: Jehovah's witness   Hyponatremia     Date: 1/7/2025            Total Time Calculated: (P) 31 min              Spiritual Assessment began in [facility]        Referral/Consult From: (P) Rounding   Encounter Overview/Reason: (P) Spiritual/Emotional Needs  Service Provided For: (P) Patient    Patient engaged readily reported \"45 years of living with and for the Lord and I believe your visit today is a divine appointment.\" Patient displayed a great knowledge of Scripture and genuine megan. Patient shared life review including his present health concerns, living alone, needing rehab, and his family support. Patient expressed appreciation for  support and prayer.    Megan, Belief, Meaning:   Patient is connected with a megan tradition or spiritual practice and has beliefs or practices that help with coping during difficult times  Family/Friends No family/friends present      Importance and Influence:  Patient has spiritual/personal beliefs that influence decisions regarding their health  Family/Friends No family/friends present    Community:  Patient is connected with a spiritual community and feels well-supported. Support system includes: Children  Family/Friends No family/friends present    Assessment and Plan of Care:     Patient Interventions include: Facilitated expression of thoughts and feelings, Explored spiritual coping/struggle/distress, Engaged in theological reflection, Affirmed coping skills/support systems, and Facilitated life review and/ or legacy  Family/Friends Interventions include: No family/friends present    Patient Plan of Care: Spiritual Care available upon further referral  Family/Friends Plan of Care: Spiritual Care available upon further referral    Electronically 
[facility]  Office: [phone]  Thien Myers DO, Maximo Burns DO, Bk Palacios DO, Anson Yung DO, Mikki Gauthier MD, Funmi Resendiz MD, Cynthia Whyte MD, Donita Pascal MD,  Cam Massey MD, Teodoro Cochran MD, Nataly Cerda MD,  Any Dhillon DO, Gaam Calero MD, Jamison Orr MD, Augustine Myers DO, Idalia Ching MD,  Edmund Yee DO, Virginia Adames MD, Ivet Temple MD, Delfina Vidales MD, Chiara Mann MD,  Joseph Lee MD, Noah Heredia MD, Montana Vazquez MD, Celia Gonsalves MD, Gómez Riley MD, Iris Akbar MD, Juaquin Orona DO, Chidi Mallory MD, Any Chapa MD, Mohsin Reza, MD, Shirley Waterhouse, CNP,  Mignon Ibarra CNP, Juaquin Colón, ERASTO,  Tiff Fletcher, TO, Alida Keene, CNP, Monet Strong, CNP, Karen Bains, CNP, Katie Stringer, CNP, Jacqueline Mckeon, PA-C, Estella Brooks PA-C, Meagan Grace, CNP, Sana Maldonado, CNP,  Mallory You, CNP, Maribel Freeman, CNP, Jayleen Velazquez, CNP,  Gail Triplett, ERASTO, Leigh Gleason, CNP         [facility]   IN-PATIENT SERVICE   [facility]    Progress Note    1/7/2025    9:49 AM    Name:   Manish Roblero  MRN:     [id number]     Acct:      [id number]   Room:   [facility]/1001-02  IP Day:  3  Admit Date:  1/4/2025  1:18 PM    PCP:   Rowdy Landis MD  Code Status:  Full Code    Subjective:     C/C: Weakness, bladder pain    Interval History Status: improved.     feels he cannot manage at home. Awaiting [facility] [place] precert.   Other than feeling weak and not himself he does not have any complaints.  He reports his abdominal pain has resolved.  He feels he is voiding normally.  No issues with bowel movements.  He continued to reiterate during my conversation with him that he did not want to be discharged home he wants to go to a facility.     Sodium level currently 134  Bladder scan on 1/6 showed 61 mL of retention    Brief History:     Per previous documentation  \"This is a 72-year-old male 
Writer called report to [facility] spoke to Saad.   
concerning  renal lesion.     No focal abnormality in the liver.  No calcified gallstones or biliary ductal  dilatation.  The spleen is normal in size without focal lesion.  The pancreas  is unremarkable.  There is a 12 mm left adrenal gland nodule which measures  less than 10 Hounsfield units and is most consistent with an incidental  adenoma.  No follow-up imaging is recommended.  The right adrenal gland is  unremarkable.     GI/Bowel: There is no bowel obstruction.  There is large stool burden.  The  appendix is normal.     Pelvis: The prostate is enlarged.  The urinary bladder is unremarkable.     Peritoneum/Retroperitoneum: No abdominal lymphadenopathy or ascites.     Bones/Soft Tissues: No acute osseous abnormality.  Redemonstrated is mild  compression chronic deformity of T12 vertebral body.     IMPRESSION:  No urinary tract calculus or collecting system dilatation.     Prostatomegaly.  Impression:   enlarged prostate   No hydronephrosis   Benign renal cysts  Patient Active Problem List   Diagnosis    Plantar fascial fibromatosis R/L    Acquired deformity of toe    Depression    GERD with esophagitis    Primary osteoarthritis of wrist    Disorder of bursae and tendons in shoulder region    Mixed hyperlipidemia    Hoarseness    Chronic prostatitis    Thrombosed hemorrhoids    Hyponatremia    Interstitial cystitis    Hyperbilirubinemia       Plan:  patient will require cystoscopy which can be scheduled electively    Rufino Soria MD  2:23 PM 1/7/2025  
SPECGRAV 1.010 08/23/2017 03:48 PM    LEUKOCYTESUR NEGATIVE 01/06/2025 08:30 AM    UROBILINOGEN Normal 01/06/2025 08:30 AM    BILIRUBINUR NEGATIVE 01/06/2025 08:30 AM    BLOODU Trace-intact 08/23/2017 03:48 PM    GLUCOSEU NEGATIVE 01/06/2025 08:30 AM    KETUA NEGATIVE 01/06/2025 08:30 AM    AMORPHOUS 2+ 01/06/2025 08:30 AM     TSH is 1.6  A.m. cortisol level is 23.7    ASSESSMENT      1.  Euvolemic hyponatremia likely secondary to SIADH.  This likely related to his outpatient use of SSRI with recent increase in free water intake.  Sodium level was 121 on admission, it is up to 129 now.  2.  History of interstitial cystitis:  3.  History of dyslipidemia  4.  History of GERD    PLAN      1.  Disc salt tabs    2.  Bmp tomorrow   3.  At this point in time no additional renal workup, will sign off at this time.  Please call me back if any questions arise.         Please do not hesitate to call with questions.    This note is created with the assistance of a speech-recognition program. While intending to generate a document that actually reflects the content of the visit, no guarantees can be provided that every mistake has been identified and corrected by editing    Electronically signed by Kostas Sharma MD on 1/7/2025 at 8:58 AM          
Frame for Short Term Goals: by discharge, pt will  Short Term Goal 1: demo and verb good understanding of ed provided on  ADL techs,  possible equip needs, BUE HEP, fall prevention, EC/WS techs, positioning techs, importance of OOB activity,and d/c recommendations  Short Term Goal 2: demo S/MI with ADL transfers with approp DME prn and good safety  Short Term Goal 3: demo S/MI with functional mob in room distances with AD prn and good safety/pacing for ADL tasks  Short Term Goal 4: demo I with UB ADLs and S/MI with LB ADLs with AE/DME as needed with good safety, pacing  Short Term Goal 5: demo S/MI with toileting routine with DME as needed with good safety  Patient Goals   Patient goals : to go home when able      Therapy Time   Individual Concurrent Group Co-treatment   Time In 1138         Time Out 1228         Minutes 50          Treatment min: 38       Tonie Daley, OT

## 2025-01-09 NOTE — PLAN OF CARE
D/c plan home today with [facility], pt denied to go to SNF and P2P denied. Pt still anxious about this. Pt remains A&O x 4, on RA. No new s/s of skin breakdown or injury. Safety protocols in place and enforced. Pt ambulates SB with walker. Pt has no c/o pain. Pt does very well with ambulation with walker will aim to build confidence in going home today.     Problem: Discharge Planning  Goal: Discharge to home or other facility with appropriate resources  1/9/2025 0725 by Jayleen Schwartz RN  Outcome: Progressing     Problem: Safety - Adult  Goal: Free from fall injury  1/9/2025 0725 by Jayleen Schwartz RN  Outcome: Progressing     Problem: Skin/Tissue Integrity - Adult  Goal: Skin integrity remains intact  1/9/2025 0725 by Jayleen Schwartz RN  Outcome: Progressing     Problem: Skin/Tissue Integrity - Adult  Goal: Incisions, wounds, or drain sites healing without S/S of infection  1/9/2025 0725 by Jayleen Schwartz RN  Outcome: Progressing     Problem: Skin/Tissue Integrity - Adult  Goal: Oral mucous membranes remain intact  1/9/2025 0725 by Jayleen Schwartz RN  Outcome: Progressing     Problem: Musculoskeletal - Adult  Goal: Return mobility to safest level of function  1/9/2025 0725 by Jayleen Schwartz RN  Outcome: Progressing     Problem: Musculoskeletal - Adult  Goal: Maintain proper alignment of affected body part  1/9/2025 0725 by Jayleen Schwartz RN  Outcome: Progressing     Problem: Musculoskeletal - Adult  Goal: Return ADL status to a safe level of function  1/9/2025 0725 by Jayleen Schwartz RN  Outcome: Progressing     Problem: Genitourinary - Adult  Goal: Absence of urinary retention  1/9/2025 0725 by Jayleen Schwartz RN  Outcome: Progressing     Problem: Metabolic/Fluid and Electrolytes - Adult  Goal: Electrolytes maintained within normal limits  1/9/2025 0725 by Jayleen Schwartz RN  Outcome: Progressing

## 2025-01-09 NOTE — CARE COORDINATION
[facility]   [address] [facility] [address]  Phone: [phone]  Fax:   Abisai Yuly [facility] - Referral Requisition   ______________________________________________________________________     Name: Maddi Roblero : 1955  Sex: M   Address: [address] [place] [address]  MRN: [id number] Phone: [phone]                          Order: AMB EXTERNAL REFERRAL TO HOME HEALTH                                                                                                   Class: External Referral              Referral Priority: Routine Order ID: [id number] Referral ID: [id number]      Associated Dx: Hyponatremia (E87.1)  Generalized weakness (R53.1)     If Coverage Data blank - Coverage Not Found.  Primary Coverage Secondary Coverage   Payor: [facility] MEDICARE [[id number]]  Plan: [facility] MEDICARE COMPLETE [[id number]]  ID: [id number]     Subscriber Information:  Name: MADDI ROBLERO  : 1955  Address: [address] [place] [address]  City: [facility]: [place]       Zip: [address]      Group No: [id number]    Payor:  []  Plan:  []  ID:      Subscriber Information:  Name: MADDI ROBLERO  : 1955  Address: [address] [place] [address]       Zip: [address]  Group No:                 Referred By: Mignon Ibarra, APRN - CNP NPI: [id number]   Referral Reason: Specialty Services Required         Referred To:                     Loc/POS:                Phone:     Phone:     Fax:     Fax:        Reason For External Referral? Patient Preference  I certify that I, or a nurse practitioner or physician assistant working with me, had an in-person encounter with the patient and the reason for the home care services is documented in the clinical note on: 2025  Will the referring provider be the attending provider for home health? No  Name of attending provider for home health: Rowdy Landis MD  The patient is confined to home: Due to illness or injury that necessitates supportive device aid, use of special

## 2025-01-09 NOTE — PLAN OF CARE
Problem: Discharge Planning  Goal: Discharge to home or other facility with appropriate resources  Outcome: Progressing  Flowsheets (Taken 1/8/2025 2000)  Discharge to home or other facility with appropriate resources:   Identify barriers to discharge with patient and caregiver   Arrange for needed discharge resources and transportation as appropriate   Identify discharge learning needs (meds, wound care, etc)   Arrange for interpreters to assist at discharge as needed   Refer to discharge planning if patient needs post-hospital services based on physician order or complex needs related to functional status, cognitive ability or social support system  Note: Discharge teaching and instructions for diagnosis/procedure explained with patient using teachback method.  Patient voiced understanding regarding prescriptions, follow up appointments, and care of self at home.     Problem: Safety - Adult  Goal: Free from fall injury  Outcome: Progressing  Note: Pt fall risk, fall band present, falling star, safety alarm activated and in use as needed. Hourly rounding performed. Pt encouraged to use call light. See Quynh fall risk assessment.     Problem: Skin/Tissue Integrity - Adult  Goal: Skin integrity remains intact  Outcome: Progressing  Flowsheets (Taken 1/8/2025 2000)  Skin Integrity Remains Intact:   Monitor for areas of redness and/or skin breakdown   Assess vascular access sites hourly   Every 4-6 hours minimum: Change oxygen saturation probe site   Every 4-6 hours: If on nasal continuous positive airway pressure, respiratory therapy assesses nares and determine need for appliance change or resting period  Note: Continuing to monitor for skin integrity risks. Patient independent with turning/repositioning. Turning/repositioning encouraged at least once every 2 hrs, and prn basis. Hygiene care being completed independently per patient; assistance provided when deemed necessary.  Goal: Incisions, wounds, or drain

## 2025-01-09 NOTE — CARE COORDINATION
Social Work-Met with patient. He began process for expedited appeal.. Phone call to insurance to facilitate expedited appeal. They are requesting medical records faxed to [phone]. Faxed fast appeal.. Patient is requesting to transfer to [facility]. He states that his son will transport at 2PM. Orders faxed. Nurse to call report to [phone].Patient  is agreeable with dc plans. Moris

## 2025-01-09 NOTE — CARE COORDINATION
Social Work-Received phone call from the Appeals Dept. They have received clinicals. Discussed with reviewer that patient will dc private pay to SNF while we are waiting for the appeal to be processed. She states that patient  will be allowed to dc to SNF private pay while waiting for the appeal. They will notify  after decision regarding appeal is finalized. . Moris

## 2025-01-09 NOTE — CARE COORDINATION
Discharge planning    [facility] has been [facility]. Confirmed fax number and will send JONATHAN once completed.     Sent home care order to her this am     0900  Met with patient and he did do expidited appeal. Social work following for placement to [facility]. If he goes today will need to notify home care    IMM letter provided to patient.  Patient offered four hours to make informed decision regarding appeal process; patient agreeable to discharge.

## 2025-01-16 ENCOUNTER — OFFICE VISIT (OUTPATIENT)
Dept: ORTHOPEDIC SURGERY | Age: 70
End: 2025-01-16
Payer: MEDICARE

## 2025-01-16 VITALS
HEART RATE: 73 BPM | SYSTOLIC BLOOD PRESSURE: 124 MMHG | DIASTOLIC BLOOD PRESSURE: 80 MMHG | BODY MASS INDEX: 20.15 KG/M2 | HEIGHT: 73 IN | WEIGHT: 152 LBS

## 2025-01-16 DIAGNOSIS — M79.672 LEFT FOOT PAIN: Primary | ICD-10-CM

## 2025-01-16 PROCEDURE — 20600 DRAIN/INJ JOINT/BURSA W/O US: CPT | Performed by: PODIATRIST

## 2025-01-16 PROCEDURE — 99214 OFFICE O/P EST MOD 30 MIN: CPT | Performed by: PODIATRIST

## 2025-01-16 NOTE — PROGRESS NOTES
[facility]         Progress and Procedure Note        Manish Roblero  MEDICAL RECORD NUMBER:  [id number]  AGE: 69 y.o.   GENDER: male  : 1955  EPISODE DATE:  25      Subjective:           Chief Complaint   Patient presents with    Toe Pain        having issues on left foot under little toe               HISTORY of PRESENT ILLNESS HPI       Patient is a 69-year-old male with history of plantar fasciitis as well as hallux limitus bilateral.  Patient's last office visit 2024.  In the interval patient has had a CT of his lumbar spine performed 2025 as well as a CT of his abdomen.  Patient was recently hospitalized for hyponatremia.  Patient states that he is currently in physical therapy for his back and deconditioning.  Patient currently wears an [employer]ta style tennis shoe.  Patient had previously obtained silicone toe spacers but states that he is stopped wearing these.  Patient's main complaint at today's visit is some residual recalcitrant hallux limitus.  Patient like to proceed with an office corticosteroid injection which she tolerated well.  Patient was provided updated handout for silicone toe spacer follow-up in 6 months.  All questions concerns regarding medical management were otherwise addressed.       PAST MEDICAL HISTORY     Past Medical History            Diagnosis Date    Acquired deformity of toe      Chronic prostatitis      Disorder of bursae and tendons in shoulder region      GERD with esophagitis      Hoarseness      Mixed hyperlipidemia      Primary osteoarthritis of wrist      Reflux esophagitis      Thrombosed hemorrhoids              PAST SURGICAL HISTORY     Past Surgical History         Past Surgical History:   Procedure Laterality Date    COLONOSCOPY   2018     [facility]    COLONOSCOPY        normal    COLONOSCOPY       CYSTOSCOPY         WITH BLADDER BIOPSY    ESOPHAGOGASTRODUODENOSCOPY

## 2025-01-17 ENCOUNTER — HOSPITAL ENCOUNTER (OUTPATIENT)
Age: 70
Setting detail: SPECIMEN
Discharge: HOME | End: 2025-01-17

## 2025-01-17 LAB
ALBUMIN SERPL-MCNC: 4.4 G/DL (ref 3.5–5.2)
ALBUMIN/GLOB SERPL: 1.8 {RATIO} (ref 1–2.5)
ALP SERPL-CCNC: 65 U/L (ref 40–129)
ALT SERPL-CCNC: 22 U/L (ref 5–41)
ANION GAP SERPL CALCULATED.3IONS-SCNC: 12 MMOL/L (ref 9–17)
AST SERPL-CCNC: 16 U/L
BASOPHILS # BLD: 0.04 K/UL (ref 0–0.2)
BASOPHILS NFR BLD: 0 % (ref 0–2)
BILIRUB SERPL-MCNC: 0.7 MG/DL (ref 0.3–1.2)
BUN SERPL-MCNC: 19 MG/DL (ref 8–23)
BUN/CREAT SERPL: 24 (ref 9–20)
CALCIUM SERPL-MCNC: 9.2 MG/DL (ref 8.6–10.4)
CHLORIDE SERPL-SCNC: 99 MMOL/L (ref 98–107)
CO2 SERPL-SCNC: 27 MMOL/L (ref 20–31)
CREAT SERPL-MCNC: 0.8 MG/DL (ref 0.7–1.2)
EOSINOPHIL # BLD: 0.08 K/UL (ref 0–0.44)
EOSINOPHILS RELATIVE PERCENT: 1 % (ref 1–4)
ERYTHROCYTE [DISTWIDTH] IN BLOOD BY AUTOMATED COUNT: 11.9 % (ref 11.8–14.4)
GFR, ESTIMATED: >90 ML/MIN/1.73M2
GLUCOSE SERPL-MCNC: 96 MG/DL (ref 70–99)
HCT VFR BLD AUTO: 36.9 % (ref 40.7–50.3)
HGB BLD-MCNC: 12.6 G/DL (ref 13–17)
IMM GRANULOCYTES # BLD AUTO: 0.03 K/UL (ref 0–0.3)
IMM GRANULOCYTES NFR BLD: 0 %
LYMPHOCYTES NFR BLD: 1.48 K/UL (ref 1.1–3.7)
LYMPHOCYTES RELATIVE PERCENT: 14 % (ref 24–43)
MCH RBC QN AUTO: 30.9 PG (ref 25.2–33.5)
MCHC RBC AUTO-ENTMCNC: 34.1 G/DL (ref 25.2–33.5)
MCV RBC AUTO: 90.4 FL (ref 82.6–102.9)
MONOCYTES NFR BLD: 0.75 K/UL (ref 0.1–1.2)
MONOCYTES NFR BLD: 7 % (ref 3–12)
NEUTROPHILS NFR BLD: 77 % (ref 36–65)
NEUTS SEG NFR BLD: 8.21 K/UL (ref 1.5–8.1)
NRBC BLD-RTO: 0 PER 100 WBC
OSMOLALITY SERPL: 290 MOSM/KG (ref 275–295)
PLATELET # BLD AUTO: 367 K/UL (ref 138–453)
PMV BLD AUTO: 8.3 FL (ref 8.1–13.5)
POTASSIUM SERPL-SCNC: 3.9 MMOL/L (ref 3.7–5.3)
PROT SERPL-MCNC: 6.9 G/DL (ref 6.4–8.3)
RBC # BLD AUTO: 4.08 M/UL (ref 4.21–5.77)
SODIUM SERPL-SCNC: 138 MMOL/L (ref 135–144)
WBC OTHER # BLD: 10.6 K/UL (ref 3.5–11.3)

## 2025-01-17 PROCEDURE — 36415 COLL VENOUS BLD VENIPUNCTURE: CPT

## 2025-01-17 PROCEDURE — 85025 COMPLETE CBC W/AUTO DIFF WBC: CPT

## 2025-01-17 PROCEDURE — 80053 COMPREHEN METABOLIC PANEL: CPT

## 2025-01-17 PROCEDURE — 83930 ASSAY OF BLOOD OSMOLALITY: CPT

## 2025-01-22 ENCOUNTER — HOSPITAL ENCOUNTER (OUTPATIENT)
Age: 70
Discharge: HOME | End: 2025-01-22
Payer: MEDICARE

## 2025-01-22 LAB
25(OH)D3 SERPL-MCNC: 99.4 NG/ML (ref 30–100)
ALBUMIN SERPL-MCNC: 4.5 G/DL (ref 3.5–5.2)
ALBUMIN/GLOB SERPL: 1.9 {RATIO} (ref 1–2.5)
ALP SERPL-CCNC: 69 U/L (ref 40–129)
ALT SERPL-CCNC: 19 U/L (ref 5–41)
ANION GAP SERPL CALCULATED.3IONS-SCNC: 10 MMOL/L (ref 9–17)
AST SERPL-CCNC: 16 U/L
BASOPHILS # BLD: 0.06 K/UL (ref 0–0.2)
BASOPHILS NFR BLD: 1 % (ref 0–2)
BILIRUB SERPL-MCNC: 0.9 MG/DL (ref 0.3–1.2)
BILIRUB UR QL STRIP: NEGATIVE
BUN SERPL-MCNC: 22 MG/DL (ref 8–23)
BUN/CREAT SERPL: 24 (ref 9–20)
CALCIUM SERPL-MCNC: 9.7 MG/DL (ref 8.6–10.4)
CHARACTER UR: ABNORMAL
CHLORIDE SERPL-SCNC: 99 MMOL/L (ref 98–107)
CK SERPL-CCNC: 71 U/L (ref 39–308)
CLARITY UR: CLEAR
CO2 SERPL-SCNC: 27 MMOL/L (ref 20–31)
COLOR UR: YELLOW
CORTIS SERPL-MCNC: 20.2 UG/DL (ref 2.5–19.5)
CORTISOL COLLECTION INFO: ABNORMAL
CREAT SERPL-MCNC: 0.9 MG/DL (ref 0.7–1.2)
CRP SERPL HS-MCNC: <3 MG/L (ref 0–5)
EOSINOPHIL # BLD: 0.11 K/UL (ref 0–0.44)
EOSINOPHILS RELATIVE PERCENT: 1 % (ref 1–4)
EPI CELLS #/AREA URNS HPF: ABNORMAL /HPF (ref 0–5)
ERYTHROCYTE [DISTWIDTH] IN BLOOD BY AUTOMATED COUNT: 12.1 % (ref 11.8–14.4)
ERYTHROCYTE [SEDIMENTATION RATE] IN BLOOD BY PHOTOMETRIC METHOD: <1 MM/HR (ref 0–20)
FERRITIN SERPL-MCNC: 449 NG/ML
FSH SERPL-ACNC: 4.3 MIU/ML (ref 1.5–12.4)
GFR, ESTIMATED: >90 ML/MIN/1.73M2
GLUCOSE SERPL-MCNC: 106 MG/DL (ref 70–99)
GLUCOSE UR STRIP-MCNC: NEGATIVE MG/DL
HCT VFR BLD AUTO: 40.3 % (ref 40.7–50.3)
HGB BLD-MCNC: 13.8 G/DL (ref 13–17)
HGB UR QL STRIP.AUTO: ABNORMAL
IMM GRANULOCYTES # BLD AUTO: 0.04 K/UL (ref 0–0.3)
IMM GRANULOCYTES NFR BLD: 1 %
IRON SATN MFR SERPL: 30 % (ref 20–55)
IRON SERPL-MCNC: 91 UG/DL (ref 61–157)
KETONES UR STRIP-MCNC: NEGATIVE MG/DL
LEUKOCYTE ESTERASE UR QL STRIP: NEGATIVE
LH SERPL-ACNC: 5.7 MIU/ML (ref 1.7–8.6)
LYMPHOCYTES NFR BLD: 1.41 K/UL (ref 1.1–3.7)
LYMPHOCYTES RELATIVE PERCENT: 18 % (ref 24–43)
MCH RBC QN AUTO: 30.7 PG (ref 25.2–33.5)
MCHC RBC AUTO-ENTMCNC: 34.2 G/DL (ref 25.2–33.5)
MCV RBC AUTO: 89.8 FL (ref 82.6–102.9)
MONOCYTES NFR BLD: 0.58 K/UL (ref 0.1–1.2)
MONOCYTES NFR BLD: 7 % (ref 3–12)
MYOGLOBIN SERPL-MCNC: 40 NG/ML (ref 28–72)
NEUTROPHILS NFR BLD: 72 % (ref 36–65)
NEUTS SEG NFR BLD: 5.73 K/UL (ref 1.5–8.1)
NITRITE UR QL STRIP: NEGATIVE
NRBC BLD-RTO: 0 PER 100 WBC
OSMOLALITY SERPL: 288 MOSM/KG (ref 275–295)
OSMOLALITY UR: 672 MOSM/KG (ref 80–1300)
PH UR STRIP: 6 [PH] (ref 5–6)
PLATELET # BLD AUTO: 390 K/UL (ref 138–453)
PMV BLD AUTO: 7.8 FL (ref 8.1–13.5)
POTASSIUM SERPL-SCNC: 4.3 MMOL/L (ref 3.7–5.3)
PROLACTIN SERPL-MCNC: 9.14 NG/ML (ref 4.04–15.2)
PROT SERPL-MCNC: 6.9 G/DL (ref 6.4–8.3)
PROT UR STRIP-MCNC: NEGATIVE MG/DL
RBC # BLD AUTO: 4.49 M/UL (ref 4.21–5.77)
RBC #/AREA URNS HPF: ABNORMAL /HPF (ref 0–4)
SODIUM SERPL-SCNC: 136 MMOL/L (ref 135–144)
SP GR UR STRIP: 1.02 (ref 1.01–1.02)
TESTOST SERPL-MCNC: 292 NG/DL (ref 193–740)
TIBC SERPL-MCNC: 308 UG/DL (ref 250–450)
TSH SERPL DL<=0.05 MIU/L-ACNC: 1.49 UIU/ML (ref 0.3–5)
UNSATURATED IRON BINDING CAPACITY: 217 UG/DL (ref 112–347)
UROBILINOGEN UR STRIP-ACNC: NORMAL EU/DL (ref 0–1)
VIT B12 SERPL-MCNC: 1025 PG/ML (ref 232–1245)
WBC #/AREA URNS HPF: ABNORMAL /HPF (ref 0–4)
WBC OTHER # BLD: 7.9 K/UL (ref 3.5–11.3)

## 2025-01-22 PROCEDURE — 36415 COLL VENOUS BLD VENIPUNCTURE: CPT

## 2025-01-22 PROCEDURE — 84403 ASSAY OF TOTAL TESTOSTERONE: CPT

## 2025-01-22 PROCEDURE — 83001 ASSAY OF GONADOTROPIN (FSH): CPT

## 2025-01-22 PROCEDURE — 82024 ASSAY OF ACTH: CPT

## 2025-01-22 PROCEDURE — 82550 ASSAY OF CK (CPK): CPT

## 2025-01-22 PROCEDURE — 82533 TOTAL CORTISOL: CPT

## 2025-01-22 PROCEDURE — 86140 C-REACTIVE PROTEIN: CPT

## 2025-01-22 PROCEDURE — 82306 VITAMIN D 25 HYDROXY: CPT

## 2025-01-22 PROCEDURE — 84146 ASSAY OF PROLACTIN: CPT

## 2025-01-22 PROCEDURE — 83874 ASSAY OF MYOGLOBIN: CPT

## 2025-01-22 PROCEDURE — 85025 COMPLETE CBC W/AUTO DIFF WBC: CPT

## 2025-01-22 PROCEDURE — 82728 ASSAY OF FERRITIN: CPT

## 2025-01-22 PROCEDURE — 82607 VITAMIN B-12: CPT

## 2025-01-22 PROCEDURE — 83002 ASSAY OF GONADOTROPIN (LH): CPT

## 2025-01-22 PROCEDURE — 85652 RBC SED RATE AUTOMATED: CPT

## 2025-01-22 PROCEDURE — 83935 ASSAY OF URINE OSMOLALITY: CPT

## 2025-01-22 PROCEDURE — 80053 COMPREHEN METABOLIC PANEL: CPT

## 2025-01-22 PROCEDURE — 81001 URINALYSIS AUTO W/SCOPE: CPT

## 2025-01-22 PROCEDURE — 83930 ASSAY OF BLOOD OSMOLALITY: CPT

## 2025-01-22 PROCEDURE — 83540 ASSAY OF IRON: CPT

## 2025-01-22 PROCEDURE — 83550 IRON BINDING TEST: CPT

## 2025-01-22 PROCEDURE — 84443 ASSAY THYROID STIM HORMONE: CPT

## 2025-01-22 RX ORDER — DEXAMETHASONE SODIUM PHOSPHATE 4 MG/ML
1 INJECTION, SOLUTION INTRA-ARTICULAR; INTRALESIONAL; INTRAMUSCULAR; INTRAVENOUS; SOFT TISSUE ONCE
Status: SHIPPED | OUTPATIENT
Start: 2025-01-22

## 2025-01-22 RX ORDER — BUPIVACAINE HYDROCHLORIDE 5 MG/ML
1 INJECTION, SOLUTION PERINEURAL ONCE
Status: SHIPPED | OUTPATIENT
Start: 2025-01-22

## 2025-01-22 RX ORDER — METHYLPREDNISOLONE ACETATE 40 MG/ML
40 INJECTION, SUSPENSION INTRA-ARTICULAR; INTRALESIONAL; INTRAMUSCULAR; SOFT TISSUE ONCE
Status: SHIPPED | OUTPATIENT
Start: 2025-01-22

## 2025-01-23 LAB — ACTH PLAS-MCNC: 25 PG/ML (ref 7–63)

## 2025-01-27 ENCOUNTER — HOSPITAL ENCOUNTER (OUTPATIENT)
Dept: CT IMAGING | Age: 70
Discharge: HOME | End: 2025-01-29
Payer: MEDICARE

## 2025-01-27 DIAGNOSIS — R26.9 GAIT ABNORMALITY: ICD-10-CM

## 2025-01-27 DIAGNOSIS — R63.4 WEIGHT LOSS: ICD-10-CM

## 2025-01-27 DIAGNOSIS — R53.1 WEAKNESS GENERALIZED: ICD-10-CM

## 2025-01-27 DIAGNOSIS — E87.1 HYPONATREMIA: ICD-10-CM

## 2025-01-27 PROCEDURE — 70450 CT HEAD/BRAIN W/O DYE: CPT

## 2025-01-27 PROCEDURE — 70486 CT MAXILLOFACIAL W/O DYE: CPT

## 2025-01-27 PROCEDURE — 71250 CT THORAX DX C-: CPT

## 2025-02-01 ENCOUNTER — HOSPITAL ENCOUNTER (EMERGENCY)
Age: 70
Discharge: HOME | End: 2025-02-01
Attending: STUDENT IN AN ORGANIZED HEALTH CARE EDUCATION/TRAINING PROGRAM
Payer: MEDICARE

## 2025-02-01 VITALS
BODY MASS INDEX: 18.55 KG/M2 | SYSTOLIC BLOOD PRESSURE: 132 MMHG | RESPIRATION RATE: 18 BRPM | HEART RATE: 78 BPM | DIASTOLIC BLOOD PRESSURE: 71 MMHG | OXYGEN SATURATION: 99 % | HEIGHT: 73 IN | WEIGHT: 140 LBS | TEMPERATURE: 97.9 F

## 2025-02-01 DIAGNOSIS — R52 GENERALIZED BODY ACHES: Primary | ICD-10-CM

## 2025-02-01 LAB
ALBUMIN SERPL-MCNC: 4.6 G/DL (ref 3.5–5.2)
ALBUMIN/GLOB SERPL: 2 {RATIO} (ref 1–2.5)
ALP SERPL-CCNC: 68 U/L (ref 40–129)
ALT SERPL-CCNC: 19 U/L (ref 5–41)
ANION GAP SERPL CALCULATED.3IONS-SCNC: 13 MMOL/L (ref 9–17)
AST SERPL-CCNC: 19 U/L
BACTERIA URNS QL MICRO: ABNORMAL
BASOPHILS # BLD: 0.04 K/UL (ref 0–0.2)
BASOPHILS NFR BLD: 1 % (ref 0–2)
BILIRUB SERPL-MCNC: 1.1 MG/DL (ref 0.3–1.2)
BILIRUB UR QL STRIP: NEGATIVE
BUN SERPL-MCNC: 26 MG/DL (ref 8–23)
BUN/CREAT SERPL: 29 (ref 9–20)
CALCIUM SERPL-MCNC: 9.7 MG/DL (ref 8.6–10.4)
CHARACTER UR: ABNORMAL
CHLORIDE SERPL-SCNC: 98 MMOL/L (ref 98–107)
CK SERPL-CCNC: 118 U/L (ref 39–308)
CLARITY UR: CLEAR
CO2 SERPL-SCNC: 24 MMOL/L (ref 20–31)
COLOR UR: YELLOW
CREAT SERPL-MCNC: 0.9 MG/DL (ref 0.7–1.2)
EOSINOPHIL # BLD: 0.06 K/UL (ref 0–0.44)
EOSINOPHILS RELATIVE PERCENT: 1 % (ref 1–4)
EPI CELLS #/AREA URNS HPF: ABNORMAL /HPF (ref 0–5)
ERYTHROCYTE [DISTWIDTH] IN BLOOD BY AUTOMATED COUNT: 12 % (ref 11.8–14.4)
GFR, ESTIMATED: >90 ML/MIN/1.73M2
GLUCOSE SERPL-MCNC: 117 MG/DL (ref 70–99)
GLUCOSE UR STRIP-MCNC: NEGATIVE MG/DL
HCT VFR BLD AUTO: 38.3 % (ref 40.7–50.3)
HGB BLD-MCNC: 13.2 G/DL (ref 13–17)
HGB UR QL STRIP.AUTO: ABNORMAL
IMM GRANULOCYTES # BLD AUTO: <0.03 K/UL (ref 0–0.3)
IMM GRANULOCYTES NFR BLD: 0 %
KETONES UR STRIP-MCNC: NEGATIVE MG/DL
LEUKOCYTE ESTERASE UR QL STRIP: NEGATIVE
LYMPHOCYTES NFR BLD: 1.17 K/UL (ref 1.1–3.7)
LYMPHOCYTES RELATIVE PERCENT: 16 % (ref 24–43)
MCH RBC QN AUTO: 31.2 PG (ref 25.2–33.5)
MCHC RBC AUTO-ENTMCNC: 34.5 G/DL (ref 25.2–33.5)
MCV RBC AUTO: 90.5 FL (ref 82.6–102.9)
MONOCYTES NFR BLD: 0.48 K/UL (ref 0.1–1.2)
MONOCYTES NFR BLD: 6 % (ref 3–12)
MUCOUS THREADS URNS QL MICRO: ABNORMAL
MYOGLOBIN SERPL-MCNC: 84 NG/ML (ref 28–72)
NEUTROPHILS NFR BLD: 76 % (ref 36–65)
NEUTS SEG NFR BLD: 5.72 K/UL (ref 1.5–8.1)
NITRITE UR QL STRIP: NEGATIVE
NRBC BLD-RTO: 0 PER 100 WBC
PH UR STRIP: 6.5 [PH] (ref 5–6)
PLATELET # BLD AUTO: 373 K/UL (ref 138–453)
PMV BLD AUTO: 8.3 FL (ref 8.1–13.5)
POTASSIUM SERPL-SCNC: 4 MMOL/L (ref 3.7–5.3)
PROT SERPL-MCNC: 6.9 G/DL (ref 6.4–8.3)
PROT UR STRIP-MCNC: NEGATIVE MG/DL
RBC # BLD AUTO: 4.23 M/UL (ref 4.21–5.77)
RBC #/AREA URNS HPF: ABNORMAL /HPF (ref 0–4)
SODIUM SERPL-SCNC: 135 MMOL/L (ref 135–144)
SP GR UR STRIP: 1.02 (ref 1.01–1.02)
TROPONIN I SERPL HS-MCNC: 13 NG/L (ref 0–22)
UROBILINOGEN UR STRIP-ACNC: NORMAL EU/DL (ref 0–1)
WBC #/AREA URNS HPF: ABNORMAL /HPF (ref 0–4)
WBC OTHER # BLD: 7.5 K/UL (ref 3.5–11.3)

## 2025-02-01 PROCEDURE — 83874 ASSAY OF MYOGLOBIN: CPT

## 2025-02-01 PROCEDURE — 93005 ELECTROCARDIOGRAM TRACING: CPT | Performed by: STUDENT IN AN ORGANIZED HEALTH CARE EDUCATION/TRAINING PROGRAM

## 2025-02-01 PROCEDURE — 85025 COMPLETE CBC W/AUTO DIFF WBC: CPT

## 2025-02-01 PROCEDURE — 99284 EMERGENCY DEPT VISIT MOD MDM: CPT

## 2025-02-01 PROCEDURE — 84484 ASSAY OF TROPONIN QUANT: CPT

## 2025-02-01 PROCEDURE — 80053 COMPREHEN METABOLIC PANEL: CPT

## 2025-02-01 PROCEDURE — 81001 URINALYSIS AUTO W/SCOPE: CPT

## 2025-02-01 PROCEDURE — 82550 ASSAY OF CK (CPK): CPT

## 2025-02-01 RX ORDER — AZITHROMYCIN 250 MG/1
250 TABLET, FILM COATED ORAL
COMMUNITY
Start: 2025-01-29 | End: 2025-02-03

## 2025-02-01 ASSESSMENT — LIFESTYLE VARIABLES
HOW OFTEN DO YOU HAVE A DRINK CONTAINING ALCOHOL: NEVER
HOW MANY STANDARD DRINKS CONTAINING ALCOHOL DO YOU HAVE ON A TYPICAL DAY: PATIENT DOES NOT DRINK

## 2025-02-01 ASSESSMENT — PAIN DESCRIPTION - PAIN TYPE: TYPE: ACUTE PAIN

## 2025-02-01 ASSESSMENT — PAIN SCALES - GENERAL: PAINLEVEL_OUTOF10: 6

## 2025-02-01 ASSESSMENT — PAIN - FUNCTIONAL ASSESSMENT: PAIN_FUNCTIONAL_ASSESSMENT: 0-10

## 2025-02-01 ASSESSMENT — PAIN DESCRIPTION - DESCRIPTORS: DESCRIPTORS: ACHING

## 2025-02-01 NOTE — ED PROVIDER NOTES
[facility] Emergency Department  [address] [facility] [address]  Phone: [phone]  EMERGENCY DEPARTMENT ENCOUNTER      Pt Name: Manish Roblero  MRN: [id number]  Birthdate 1955  Date of evaluation: 2/1/2025    CHIEF COMPLAINT       Chief Complaint   Patient presents with    \"i can't move\"       HISTORY OF PRESENT ILLNESS    Manish Roblero is a 69 y.o. male who presents to the emergency department with several complaints.  He states this has been ongoing for some time, when asked specifically he states over several weeks.  And later on in the conversation says over the last several years.  He states that he has diffuse body aches, sometimes feels like his feet are stuck to the ground.  Denies any numbness or weakness.  He also states he is trouble getting his shoes on.  Denies any headache, chest pain, difficulty breathing, nausea, vomiting, abdominal pain, fevers, chills.    Does report he has been evaluated several times due to similar symptoms.  He had a recent admission for hyponatremia, was discharged with salt tabs and fluid restriction.  He does state he completed a salt tabs, is unsure of any fluid restriction.    REVIEW OF SYSTEMS     Please see HPI    PAST MEDICAL HISTORY    has a past medical history of Acquired deformity of toe, Chronic prostatitis, Disorder of bursae and tendons in shoulder region, GERD with esophagitis, Hoarseness, Mixed hyperlipidemia, Primary osteoarthritis of wrist, Reflux esophagitis, and Thrombosed hemorrhoids.    SURGICAL HISTORY      has a past surgical history that includes Cystocopy; lymph node dissection (Left); Colonoscopy (05/18/2018); sinus surgery (Bilateral, 01/01/1994); Esophagogastroduodenoscopy (05/14/2018); Colonoscopy (2011); Colonoscopy (2007); Hemorrhoid surgery (2013); other surgical history; Tonsillectomy and adenoidectomy; Tooth Extraction (06/2017); and hernia repair (Right, 2021).    CURRENT MEDICATIONS       Previous Medications    ASPIRIN 81 MG  examination.  Neurological: No focal deficits appreciated. Cranial nerves II-XII grossly intact. Motor against resistance and sensory intact and equal in all extremities.  No ataxia.          DIFFERENTIAL DIAGNOSIS/ MDM:   Medical Decision Making  69-year-old male who presents to the emergency department due to body aches ongoing for significant amount of time.  Over the last month he has had multiple CT scans and evaluations for the same symptoms.  Most recently his PCP Dr. Landis who started him on a azithromycin course, patient has taken 2 days of this course, states his symptoms have not improved but have not worsened.  We discussed if he was taking any Tylenol or Motrin to help with the body aches and he denied this.  States that he does not like to take medication.  On evaluation patient's vital signs are unremarkable, he has no focal neurologic deficits.  No tenderness, he does refuse having issues removed for further examination of his feet.  Attempted to reach out to Dr. Landis's office, they are not open at this time and do not have an on-call number.  Also attempted to reach out to patient's son to further discuss patient's symptoms.  He did not answer.    Patient is alert and oriented x 3, has decision-making capacity.  I do have concern that patient may be developing Parkinson's disease or early onset dementia.  He has some tangential thoughts.  Discussed laboratory results at length with the patient, discussed need for PCP follow-up and moving his appointment for next week up if possible.  We also discussed return precautions and he states he is comfortable with going home, his son lives close by and can check on him if needed.    Amount and/or Complexity of Data Reviewed  Labs: ordered.  ECG/medicine tests: ordered.            Differential diagnosis considered: Vertebral fracture, epidural abscess, sprain, strain, contusion, muscle spasm, osteomyelitis, cauda equina, herniated disc, compartment

## 2025-02-01 NOTE — DISCHARGE INSTRUCTIONS
You were seen in the emergency department today for body aches that have been ongoing for several years your work-up showed unremarkable electrolytes, urine does show trace amounts of blood.  You are not having your symptoms currently, denied any symptomatic control.    Please follow-up with your PCP within the next 2 to 3 days, we also provided you with neurology referral to Dr. Camacho       PLEASE RETURN TO THE EMERGENCY DEPARTMENT IMMEDIATELY for worsening symptoms, or if you develop any concerning symptoms such as: high fever not relieved by acetaminophen (Tylenol) and/or ibuprofen (Motrin), chills, shortness of breath, chest pain, persistent nausea and/or vomiting, numbness, weakness or tingling in the arms or legs or change in color of the extremities, changes in mental status, persistent headache, blurry vision, unable to follow up with your physician, or other any other care or concern.

## 2025-02-02 ENCOUNTER — HOSPITAL ENCOUNTER (EMERGENCY)
Age: 70
Discharge: HOME | End: 2025-02-02
Attending: EMERGENCY MEDICINE
Payer: MEDICARE

## 2025-02-02 ENCOUNTER — HOSPITAL ENCOUNTER (EMERGENCY)
Age: 70
Discharge: HOME | End: 2025-02-03
Attending: EMERGENCY MEDICINE
Payer: MEDICARE

## 2025-02-02 VITALS
OXYGEN SATURATION: 97 % | SYSTOLIC BLOOD PRESSURE: 139 MMHG | HEART RATE: 89 BPM | TEMPERATURE: 98.4 F | DIASTOLIC BLOOD PRESSURE: 90 MMHG | RESPIRATION RATE: 16 BRPM

## 2025-02-02 DIAGNOSIS — G89.29 CHRONIC PAIN OF BOTH LOWER EXTREMITIES: Primary | ICD-10-CM

## 2025-02-02 DIAGNOSIS — F41.1 ANXIETY STATE: Primary | ICD-10-CM

## 2025-02-02 DIAGNOSIS — M79.605 CHRONIC PAIN OF BOTH LOWER EXTREMITIES: Primary | ICD-10-CM

## 2025-02-02 DIAGNOSIS — M79.604 CHRONIC PAIN OF BOTH LOWER EXTREMITIES: Primary | ICD-10-CM

## 2025-02-02 LAB
EKG ATRIAL RATE: 82 BPM
EKG P AXIS: 80 DEGREES
EKG P-R INTERVAL: 218 MS
EKG Q-T INTERVAL: 358 MS
EKG QRS DURATION: 82 MS
EKG QTC CALCULATION (BAZETT): 418 MS
EKG R AXIS: 53 DEGREES
EKG T AXIS: 63 DEGREES
EKG VENTRICULAR RATE: 82 BPM

## 2025-02-02 PROCEDURE — 96372 THER/PROPH/DIAG INJ SC/IM: CPT

## 2025-02-02 PROCEDURE — 99282 EMERGENCY DEPT VISIT SF MDM: CPT

## 2025-02-02 PROCEDURE — 99284 EMERGENCY DEPT VISIT MOD MDM: CPT

## 2025-02-02 PROCEDURE — 6360000002 HC RX W HCPCS: Performed by: EMERGENCY MEDICINE

## 2025-02-02 RX ORDER — KETOROLAC TROMETHAMINE 30 MG/ML
30 INJECTION, SOLUTION INTRAMUSCULAR; INTRAVENOUS ONCE
Status: COMPLETED | OUTPATIENT
Start: 2025-02-03 | End: 2025-02-02

## 2025-02-02 RX ADMIN — KETOROLAC TROMETHAMINE 30 MG: 30 INJECTION, SOLUTION INTRAMUSCULAR at 23:52

## 2025-02-02 ASSESSMENT — PAIN SCALES - GENERAL: PAINLEVEL_OUTOF10: 6

## 2025-02-02 ASSESSMENT — PAIN DESCRIPTION - LOCATION: LOCATION: OTHER (COMMENT)

## 2025-02-02 ASSESSMENT — PAIN DESCRIPTION - PAIN TYPE: TYPE: ACUTE PAIN

## 2025-02-02 ASSESSMENT — PAIN DESCRIPTION - DESCRIPTORS: DESCRIPTORS: ACHING

## 2025-02-02 ASSESSMENT — PAIN - FUNCTIONAL ASSESSMENT
PAIN_FUNCTIONAL_ASSESSMENT: ACTIVITIES ARE NOT PREVENTED
PAIN_FUNCTIONAL_ASSESSMENT: NONE - DENIES PAIN
PAIN_FUNCTIONAL_ASSESSMENT: ACTIVITIES ARE NOT PREVENTED
PAIN_FUNCTIONAL_ASSESSMENT: 0-10

## 2025-02-02 NOTE — ED PROVIDER NOTES
[facility] Emergency Department  [address] [address]  Phone: [phone]      Pt Name: Manish Roblero  MRN:[id number]  Birthdate 1955  Date of evaluation: 2/2/2025      CHIEF COMPLAINT       Chief Complaint   Patient presents with    Shaking     Pt presents to front window stating I need to be seen again for shaking, seen earlier in day for same, pt ambulates, slow/steady, appears to tolerate well, goes through a list of complaints that pt does have follow up appts with specialties for them, then clarifies would like to be seen for continued shaking, pt noted to be still in bed, then states it isn't happening currently, voices concerns about being home alone       HISTORY OF PRESENT ILLNESS   Manish Roblero is a 69 y.o. male who presents for evaluation of anxiety.  He reports that for years he has had intermittent shakiness and generalized weakness.  The patient was seen in the emergency department yesterday for the same symptoms.  Labs and EKG were unremarkable.  The patient states that he has been seen multiple times in the ER for his symptoms and his workup has been unremarkable except for hyponatremia which was corrected.  Sodium levels yesterday were normal.  The patient states that he lives alone and he felt anxious tonight which prompted him to come back to the emergency department.  He denies any change in his chronic symptoms.  He is scheduled to see neurology and podiatry next week.  He is currently on azithromycin for a chronic sinus infection.  The patient denies fall, striking his head, neck pain, back pain, chest pain, shortness of breath, headache, vision changes, abdominal pain, nausea, vomiting, urinary/bowel symptoms, dizziness, lightheadedness, focal weakness, numbness, tingling, or recent injury.    REVIEW OF SYSTEMS     Positive: Generalized weakness, fatigue, intermittent shakiness  Ten point review of systems was reviewed and is negative unless otherwise noted in the

## 2025-02-02 NOTE — ED NOTES
Pt resting in bed, skin w/d, resp easy, requests to stay a little longer for comfort, will continue to monitor

## 2025-02-03 VITALS
HEART RATE: 84 BPM | DIASTOLIC BLOOD PRESSURE: 80 MMHG | TEMPERATURE: 99 F | HEIGHT: 73 IN | OXYGEN SATURATION: 97 % | WEIGHT: 140 LBS | SYSTOLIC BLOOD PRESSURE: 120 MMHG | BODY MASS INDEX: 18.55 KG/M2 | RESPIRATION RATE: 20 BRPM

## 2025-02-03 ASSESSMENT — PAIN - FUNCTIONAL ASSESSMENT: PAIN_FUNCTIONAL_ASSESSMENT: ACTIVITIES ARE NOT PREVENTED

## 2025-02-03 ASSESSMENT — PAIN DESCRIPTION - DESCRIPTORS: DESCRIPTORS: ACHING

## 2025-02-03 ASSESSMENT — PAIN SCALES - GENERAL: PAINLEVEL_OUTOF10: 5

## 2025-02-03 NOTE — ED PROVIDER NOTES
[facility] Emergency Department  [address] [address]  Phone: [phone]        [facility] EMERGENCY DEPARTMENT  EMERGENCY DEPARTMENT ENCOUNTER      Pt Name: Manish Roblero  MRN: [id number]  Birthdate 1955  Date of evaluation: 2/2/2025  Provider: Rowdy Crow DO    CHIEF COMPLAINT       Chief Complaint   Patient presents with    Extremity Weakness         HISTORY OF PRESENT ILLNESS   (Location/Symptom, Timing/Onset,Context/Setting, Quality, Duration, Modifying Factors, Severity)  Note limiting factors.   Manish Roblero is a 69 y.o. male who presents to the emergency department for the evaluation of leg pain.  The chief complaint notes extremity weakness but he does not have weakness.  He says his legs hurt so bad he can hardly walk.  This is not a new problem.  He has no fever.  No bowel or bladder incontinence.  No back pain.  This is a chronic issue.  He was seen in this emergency department twice yesterday with similar chronic issues.  It is unclear what changed or is different today to make him have called the rescue squad and come to the ER.  When I asked him, he says its because his son was not around to help him.  He does not take anything at home for pain and he does not want anything here for pain.  Again, I asked him what his goal was by coming to the emergency department and he was not really clear.    Nursing Notes were reviewed.    REVIEW OF SYSTEMS    (2-9systems for level 4, 10 or more for level 5)     Review of Systems   Musculoskeletal:         Bilateral leg pain   Neurological:  Negative for weakness.       Except asnoted above the remainder of the review of systems was reviewed and negative.       PAST MEDICAL HISTORY     Past Medical History:   Diagnosis Date    Acquired deformity of toe     Chronic prostatitis     Disorder of bursae and tendons in shoulder region     GERD with esophagitis     Hoarseness     Mixed hyperlipidemia     Primary osteoarthritis of

## 2025-02-06 ENCOUNTER — HOSPITAL ENCOUNTER (OUTPATIENT)
Age: 70
Discharge: HOME | End: 2025-02-06
Payer: MEDICARE

## 2025-02-06 PROCEDURE — 36415 COLL VENOUS BLD VENIPUNCTURE: CPT

## 2025-02-06 PROCEDURE — 84155 ASSAY OF PROTEIN SERUM: CPT

## 2025-02-06 PROCEDURE — 84165 PROTEIN E-PHORESIS SERUM: CPT

## 2025-02-07 ENCOUNTER — TRANSCRIBE ORDERS (OUTPATIENT)
Dept: GENERAL RADIOLOGY | Age: 70
End: 2025-02-07

## 2025-02-07 DIAGNOSIS — R42 DIZZINESS: ICD-10-CM

## 2025-02-07 DIAGNOSIS — R26.9 GAIT ABNORMALITY: Primary | ICD-10-CM

## 2025-02-07 DIAGNOSIS — R53.1 WEAKNESS: ICD-10-CM

## 2025-02-07 LAB
ALBUMIN PERCENT: 65 % (ref 56–66)
ALBUMIN SERPL-MCNC: 4.4 G/DL (ref 3.2–5.2)
ALPHA 2 PERCENT: 9 % (ref 7–12)
ALPHA1 GLOB SERPL ELPH-MCNC: 0.3 G/DL (ref 0.1–0.4)
ALPHA1 GLOB SERPL ELPH-MCNC: 4 % (ref 3–5)
ALPHA2 GLOB SERPL ELPH-MCNC: 0.6 G/DL (ref 0.5–0.9)
B-GLOBULIN SERPL ELPH-MCNC: 0.7 G/DL (ref 0.7–1.4)
B-GLOBULIN SERPL ELPH-MCNC: 10 % (ref 8–13)
GAMMA GLOB SERPL ELPH-MCNC: 0.8 G/DL (ref 0.5–1.5)
GAMMA GLOBULIN %: 12 % (ref 11–19)
PATHOLOGIST: NORMAL
PROT PATTERN SERPL ELPH-IMP: NORMAL
PROT SERPL-MCNC: 6.7 G/DL (ref 6.6–8.7)
TOTAL PROT. SUM,%: 100 % (ref 98–102)
TOTAL PROT. SUM: 6.8 G/DL (ref 6.3–8.2)

## 2025-02-08 LAB
SEND OUT REPORT: NORMAL
TEST NAME: NORMAL

## 2025-02-13 LAB
SEND OUT REPORT: NORMAL
TEST NAME: NORMAL

## 2025-02-18 ENCOUNTER — HOSPITAL ENCOUNTER (OUTPATIENT)
Dept: MRI IMAGING | Age: 70
Discharge: HOME | End: 2025-02-20
Payer: MEDICARE

## 2025-02-18 DIAGNOSIS — R53.1 WEAKNESS: ICD-10-CM

## 2025-02-18 DIAGNOSIS — R26.9 GAIT ABNORMALITY: ICD-10-CM

## 2025-02-18 DIAGNOSIS — R42 DIZZINESS: ICD-10-CM

## 2025-02-18 PROCEDURE — 2709999900 MRI BRAIN W WO CONTRAST

## 2025-02-18 PROCEDURE — 6360000004 HC RX CONTRAST MEDICATION: Performed by: FAMILY MEDICINE

## 2025-02-18 PROCEDURE — A9579 GAD-BASE MR CONTRAST NOS,1ML: HCPCS | Performed by: FAMILY MEDICINE

## 2025-02-18 RX ADMIN — GADOTERIDOL 15 ML: 279.3 INJECTION, SOLUTION INTRAVENOUS at 14:30

## 2025-02-19 ENCOUNTER — OFFICE VISIT (OUTPATIENT)
Dept: PRIMARY CARE CLINIC | Age: 70
End: 2025-02-19
Payer: MEDICARE

## 2025-02-19 VITALS
DIASTOLIC BLOOD PRESSURE: 70 MMHG | TEMPERATURE: 98.1 F | HEART RATE: 86 BPM | SYSTOLIC BLOOD PRESSURE: 100 MMHG | OXYGEN SATURATION: 97 %

## 2025-02-19 DIAGNOSIS — B37.2 CANDIDAL SKIN INFECTION: Primary | ICD-10-CM

## 2025-02-19 PROCEDURE — 3017F COLORECTAL CA SCREEN DOC REV: CPT

## 2025-02-19 PROCEDURE — 1036F TOBACCO NON-USER: CPT

## 2025-02-19 PROCEDURE — 1159F MED LIST DOCD IN RCRD: CPT

## 2025-02-19 PROCEDURE — 1160F RVW MEDS BY RX/DR IN RCRD: CPT

## 2025-02-19 PROCEDURE — 99213 OFFICE O/P EST LOW 20 MIN: CPT

## 2025-02-19 PROCEDURE — G8427 DOCREV CUR MEDS BY ELIG CLIN: HCPCS

## 2025-02-19 PROCEDURE — 1123F ACP DISCUSS/DSCN MKR DOCD: CPT

## 2025-02-19 PROCEDURE — G8419 CALC BMI OUT NRM PARAM NOF/U: HCPCS

## 2025-02-19 PROCEDURE — 99212 OFFICE O/P EST SF 10 MIN: CPT

## 2025-02-19 RX ORDER — MIRTAZAPINE 15 MG/1
TABLET, FILM COATED ORAL
COMMUNITY
Start: 2025-02-06 | End: 2025-03-06

## 2025-02-19 RX ORDER — NYSTATIN 100000 U/G
OINTMENT TOPICAL
Qty: 30 G | Refills: 0 | Status: SHIPPED | OUTPATIENT
Start: 2025-02-19

## 2025-02-19 NOTE — PATIENT INSTRUCTIONS
Start cream as prescribed - apply twice daily and apply for 1 week after the rash seems to be gone  Keep area clean and dry - may wash with dial soap  May use tylenol and ibuprofen for pain/ fever  If symptoms worsen follow up with PCP or return to walk in clinic  Patient verbalized understanding and agrees with plan of care

## 2025-02-19 NOTE — PROGRESS NOTES
[facility] department of [facility]  [address] [facility] [address]  Phone: [phone]  Fax: [phone]      Manish Roblero  1955  MRN: [id number]  Date of visit: 2/19/2025    Chief Complaint:     Manish Roblero is here for c/o of Rash (In belly button area has had it for a few days )      HPI:     Manish Roblero is a 69 y.o. male who presents to the [facility] Walk-In Care today for his medical conditions/complaints as noted below.    Rash  This is a new problem. Episode onset: 3 days. The problem has been gradually worsening since onset. Location: belly button. The rash is characterized by redness and pain. Pertinent negatives include no fever. Treatments tried: antibiotic ointment, antibacterial soap, peroxide. The treatment provided mild relief.       Past Medical History:   Diagnosis Date    Acquired deformity of toe     Chronic prostatitis     Disorder of bursae and tendons in shoulder region     GERD with esophagitis     Hoarseness     Mixed hyperlipidemia     Primary osteoarthritis of wrist     Reflux esophagitis     Thrombosed hemorrhoids         Allergies   Allergen Reactions    Pcn [Penicillins]      Skin tested positive as child    Sulfa Antibiotics Nausea Only    Triamcinolone     Povidone Iodine Rash    Triamcinolone Hexacetonide Rash     Topical Kenalog only. Patient reports having multiple celestone injections without rash         Subjective:      Review of Systems   Constitutional:  Negative for fever.   Skin:  Positive for rash.       Objective:     Vitals:    02/19/25 1451   BP: 100/70   Pulse: 86   Temp: 98.1 °F (36.7 °C)   SpO2: 97%     There is no height or weight on file to calculate BMI.    Physical Exam  Vitals and nursing note reviewed.   Constitutional:       Appearance: Normal appearance. He is not ill-appearing.   HENT:      Head: Normocephalic and atraumatic.      Nose: Nose normal.   Eyes:      Conjunctiva/sclera: Conjunctivae normal.

## 2025-02-24 ENCOUNTER — OFFICE VISIT (OUTPATIENT)
Dept: UROLOGY | Age: 70
End: 2025-02-24
Payer: MEDICARE

## 2025-02-24 VITALS — HEART RATE: 86 BPM | OXYGEN SATURATION: 99 % | DIASTOLIC BLOOD PRESSURE: 82 MMHG | SYSTOLIC BLOOD PRESSURE: 122 MMHG

## 2025-02-24 DIAGNOSIS — N40.1 BENIGN LOCALIZED PROSTATIC HYPERPLASIA WITH LOWER URINARY TRACT SYMPTOMS (LUTS): Primary | ICD-10-CM

## 2025-02-24 PROCEDURE — 1036F TOBACCO NON-USER: CPT | Performed by: UROLOGY

## 2025-02-24 PROCEDURE — 99204 OFFICE O/P NEW MOD 45 MIN: CPT | Performed by: UROLOGY

## 2025-02-24 PROCEDURE — 3017F COLORECTAL CA SCREEN DOC REV: CPT | Performed by: UROLOGY

## 2025-02-24 PROCEDURE — 99215 OFFICE O/P EST HI 40 MIN: CPT | Performed by: UROLOGY

## 2025-02-24 PROCEDURE — 1123F ACP DISCUSS/DSCN MKR DOCD: CPT | Performed by: UROLOGY

## 2025-02-24 PROCEDURE — G8419 CALC BMI OUT NRM PARAM NOF/U: HCPCS | Performed by: UROLOGY

## 2025-02-24 PROCEDURE — 1159F MED LIST DOCD IN RCRD: CPT | Performed by: UROLOGY

## 2025-02-24 PROCEDURE — G8427 DOCREV CUR MEDS BY ELIG CLIN: HCPCS | Performed by: UROLOGY

## 2025-02-24 RX ORDER — TAMSULOSIN HYDROCHLORIDE 0.4 MG/1
0.4 CAPSULE ORAL DAILY
Qty: 90 CAPSULE | Refills: 3 | Status: SHIPPED | OUTPATIENT
Start: 2025-02-24 | End: 2025-05-25

## 2025-02-24 NOTE — PROGRESS NOTES
negative  Genitourinary: see HPI  Musculoskeletal: negative  Skin: negative   Neurological: negative  Hematological/Lymphatic: negative  Psychological: negative      Physical Exam:    This a 69 y.o. male  Vitals:    02/24/25 1123   BP: 122/82   Pulse: 86   SpO2: 99%     There is no height or weight on file to calculate BMI.  Constitutional: Patient in no acute distress;   Using walker    Assessment and Plan        1. Benign localized prostatic hyperplasia with lower urinary tract symptoms (LUTS)               Plan:   Assessment & Plan   I do not believe pt has IC    BPH- worsening LUTS. start flomax. If he cannot tolerate the size of the pill, we can switch to rapaflo.       Cystoscopy mac (5) stretcher in AM    Prescriptions Ordered:  No orders of the defined types were placed in this encounter.     Orders Placed:  No orders of the defined types were placed in this encounter.           ROBINSON RENTERIA MD

## 2025-02-24 NOTE — PATIENT INSTRUCTIONS
Surgery Date: _____________________________________________________________  *You will receive a phone call with your ARRIVAL TIME the AFTERNOON before the   surgery/procedure  Your arrival & surgery time is subject to change. You will receive a phone call the afternoon   before surgery to confirm the time. Please listen to your answering machine or voice mail the day   before SURGERY.  Pre-Surgery Instructions  [facility] has made preparations for your day of surgery to be safe and   comfortable as possible. If you follow the instructions below, you will assist us in providing you   with very good care.  ?   Check in at [facility] Entrance (Enter back building under overhead   crosswalk that connects the 2 buildings)  ?   DO NOT EAT or DRINK ANYTHING after MIDNIGHT. This includes gum, hard candy,   and mints.  ?   Vitamins, Herbal supplements, Advil, Motrin (ibuprofen), and Aleve(naproxen) are to be STOPPED 7 days before surgery.   YOU MAY TAKE TYLENOL   ?   STOP these medications before surgery.  ? Date to STOP: _________________ Medication: ___________________________  ? Date to STOP: _________________ Medication: ___________________________  ?   Medications to be taken at home the morning of your surgery should be with a SMALL   SIP of WATER:  ? Med(s) to be taken: __________________________________________________  PRE-PROCEDURE PREP to be DONE at HOME  ?   Shower night before & morning of surgery from the neck down.  The DAY of SURGERY:  ?  Do NOT use deodorant, lotions, creams, or powders  ?  Do NOT wear contacts  ?  Do NOT wear make-up and ask about your nail polish  ?  Do NOT wear or bring jewelry (including your wedding ring) & also remove ALL body   piercings.  ?  Do NOT bring valuables (but see below)  ?  Please wear loose, comfortable clothing.  ?  DO NOT SMOKE the NIGHT BEFORE & MORNING of SURGERY  ?  Avoid alcohol 24 hours before surgery  ?  DO NOT USE Illegal Drugs 5

## 2025-02-25 ENCOUNTER — PREP FOR PROCEDURE (OUTPATIENT)
Dept: UROLOGY | Age: 70
End: 2025-02-25

## 2025-02-25 ENCOUNTER — TELEPHONE (OUTPATIENT)
Dept: UROLOGY | Age: 70
End: 2025-02-25

## 2025-02-25 DIAGNOSIS — N40.1 ENLARGED PROSTATE WITH URINARY OBSTRUCTION: ICD-10-CM

## 2025-02-25 DIAGNOSIS — N13.8 ENLARGED PROSTATE WITH URINARY OBSTRUCTION: ICD-10-CM

## 2025-02-25 NOTE — TELEPHONE ENCOUNTER
[facility]     Pre-Operative Evaluation/Consultation    Name:  Manish Roblero                                         Age:  69 y.o.  MRN:  [id number]       :  1955   Date:  2025         Sex: male    BPH    Surgeon:  Dr. Wan Cochran  Procedure (Planned):  Cystoscopy  Date Scheduled surgery: 3/10/25    Attending : No att. providers found    Primary Physician: Dr.Timothy Landis  Cardiologist: None    Type of Anesthesia Requested: Monitored Anesthesia Care    Patient Medical history:  Allergies   Allergen Reactions    Pcn [Penicillins]      Skin tested positive as child    Sulfa Antibiotics Nausea Only    Triamcinolone     Povidone Iodine Rash    Triamcinolone Hexacetonide Rash     Topical Kenalog only. Patient reports having multiple celestone injections without rash     Social History     Tobacco Use    Smoking status: Former     Current packs/day: 0.00     Average packs/day: 0.5 packs/day for 2.0 years (1.0 ttl pk-yrs)     Types: Cigarettes     Start date: 1977     Quit date:      Years since quittin.1    Smokeless tobacco: Never   Vaping Use    Vaping status: Never Used   Substance Use Topics    Alcohol use: No    Drug use: No         Additional ordered pre-operative testing:  []CBC    []ABG      [] BMP   []URINALYSIS   []CMP    []HCG   []COAGS PT/INR  []T&C  []LFTs   []TYPE AND SCREEN    [] EKG  [] Chest X-Ray  [] Other Radiology    [] Sent to Hospitalist None  [] Sent to Anesthesia for your review: None   [] Additional Orders: None     Comments:None   Requests: No Special requests    Signed: Zhanna Davenport LPN 2025 12:11 PM

## 2025-02-26 ENCOUNTER — HOSPITAL ENCOUNTER (OUTPATIENT)
Dept: MRI IMAGING | Age: 70
Discharge: HOME | End: 2025-02-28
Payer: MEDICARE

## 2025-02-26 ENCOUNTER — HOSPITAL ENCOUNTER (OUTPATIENT)
Age: 70
Discharge: HOME | End: 2025-02-26
Payer: MEDICARE

## 2025-02-26 ENCOUNTER — OFFICE VISIT (OUTPATIENT)
Dept: PRIMARY CARE CLINIC | Age: 70
End: 2025-02-26
Payer: MEDICARE

## 2025-02-26 VITALS
OXYGEN SATURATION: 98 % | DIASTOLIC BLOOD PRESSURE: 80 MMHG | TEMPERATURE: 97.6 F | HEART RATE: 83 BPM | SYSTOLIC BLOOD PRESSURE: 100 MMHG

## 2025-02-26 DIAGNOSIS — R27.0 ATAXIA: ICD-10-CM

## 2025-02-26 DIAGNOSIS — R30.0 DYSURIA: ICD-10-CM

## 2025-02-26 DIAGNOSIS — R39.15 URGENCY OF URINATION: Primary | ICD-10-CM

## 2025-02-26 DIAGNOSIS — R39.15 URGENCY OF URINATION: ICD-10-CM

## 2025-02-26 DIAGNOSIS — R53.1 WEAKNESS: ICD-10-CM

## 2025-02-26 LAB
AMORPH SED URNS QL MICRO: ABNORMAL
BILIRUB UR QL STRIP: NEGATIVE
CHARACTER UR: ABNORMAL
CLARITY UR: ABNORMAL
COLOR UR: YELLOW
EPI CELLS #/AREA URNS HPF: ABNORMAL /HPF (ref 0–5)
GLUCOSE UR STRIP-MCNC: NEGATIVE MG/DL
HGB UR QL STRIP.AUTO: NEGATIVE
KETONES UR STRIP-MCNC: NEGATIVE MG/DL
LEUKOCYTE ESTERASE UR QL STRIP: NEGATIVE
NITRITE UR QL STRIP: NEGATIVE
PH UR STRIP: 6.5 [PH] (ref 5–6)
PROT UR STRIP-MCNC: NEGATIVE MG/DL
RBC #/AREA URNS HPF: ABNORMAL /HPF (ref 0–4)
SP GR UR STRIP: 1.02 (ref 1.01–1.02)
UROBILINOGEN UR STRIP-ACNC: NORMAL EU/DL (ref 0–1)
WBC #/AREA URNS HPF: ABNORMAL /HPF (ref 0–4)

## 2025-02-26 PROCEDURE — 81001 URINALYSIS AUTO W/SCOPE: CPT

## 2025-02-26 PROCEDURE — 3017F COLORECTAL CA SCREEN DOC REV: CPT | Performed by: NURSE PRACTITIONER

## 2025-02-26 PROCEDURE — 99212 OFFICE O/P EST SF 10 MIN: CPT | Performed by: NURSE PRACTITIONER

## 2025-02-26 PROCEDURE — 99213 OFFICE O/P EST LOW 20 MIN: CPT | Performed by: NURSE PRACTITIONER

## 2025-02-26 PROCEDURE — 1159F MED LIST DOCD IN RCRD: CPT | Performed by: NURSE PRACTITIONER

## 2025-02-26 PROCEDURE — 1036F TOBACCO NON-USER: CPT | Performed by: NURSE PRACTITIONER

## 2025-02-26 PROCEDURE — 1160F RVW MEDS BY RX/DR IN RCRD: CPT | Performed by: NURSE PRACTITIONER

## 2025-02-26 PROCEDURE — G8427 DOCREV CUR MEDS BY ELIG CLIN: HCPCS | Performed by: NURSE PRACTITIONER

## 2025-02-26 PROCEDURE — 72141 MRI NECK SPINE W/O DYE: CPT

## 2025-02-26 PROCEDURE — 1123F ACP DISCUSS/DSCN MKR DOCD: CPT | Performed by: NURSE PRACTITIONER

## 2025-02-26 PROCEDURE — 87086 URINE CULTURE/COLONY COUNT: CPT

## 2025-02-26 PROCEDURE — G8419 CALC BMI OUT NRM PARAM NOF/U: HCPCS | Performed by: NURSE PRACTITIONER

## 2025-02-26 RX ORDER — PHENAZOPYRIDINE HYDROCHLORIDE 100 MG/1
100 TABLET, FILM COATED ORAL 3 TIMES DAILY PRN
Qty: 9 TABLET | Refills: 0 | Status: SHIPPED | OUTPATIENT
Start: 2025-02-26 | End: 2025-03-01

## 2025-02-26 NOTE — PROGRESS NOTES
Subjective:      Patient ID: Manish Roblero is a 69 y.o. male coming in for   Chief Complaint   Patient presents with    Urinary Tract Infection        History of Present Illness  The patient is a 69-year-old male who presents to urgent care with UTI-like symptoms.    He was evaluated by Dr. Seals yesterday, who ruled out interstitial cystitis, a condition he was diagnosed with over 30 years ago. The current suspicion is towards a prostatic etiology. He reports experiencing dysuria, characterized by a burning sensation that fluctuates in intensity. Despite the discomfort, he maintains a strong urinary stream. Hematuria has been noted, but he reports no abdominal or back pain. He also reports no systemic symptoms such as fever or chills. He has been adhering to a strict diet for several years and does not consume caffeine. He recalls a previous recommendation from Dr. Higgins to increase his water intake, which unfortunately resulted in hyponatremia and subsequent hospitalization for 5 days. He was prescribed Flomax, which he believes may be causing mild constipation.    Supplemental Information  His current medication regimen includes metformin, Ativan, and vitamin D.    SOCIAL HISTORY  He does not drink caffeine.    MEDICATIONS  Flomax, metformin, Ativan, vitamin D.      Review of Systems     Objective:/80   Pulse 83   Temp 97.6 °F (36.4 °C)   SpO2 98%      Physical Exam  Vitals and nursing note reviewed.   Constitutional:       General: He is not in acute distress.     Appearance: Normal appearance. He is not ill-appearing.   HENT:      Head: Normocephalic.   Cardiovascular:      Rate and Rhythm: Normal rate and regular rhythm.      Heart sounds: Normal heart sounds.   Pulmonary:      Effort: Pulmonary effort is normal.      Breath sounds: Normal breath sounds.   Abdominal:      General: Bowel sounds are normal.      Palpations: Abdomen is soft.      Tenderness: There is no abdominal tenderness.   Skin:

## 2025-02-26 NOTE — TELEPHONE ENCOUNTER
Cystoscopy under MAC scheduled for 3/10/25 at [facility].  Medication list, insurance cards, and last OV notes available via EMR.  Notified patient of procedure date and informed that the surgery center will call on the Friday afternoon prior to procedure with arrival time. Patient instructed to be NPO after midnight. Instructed to continue medications as directed per anesthesia. Patient repeats instructions back and voices understanding.

## 2025-02-27 LAB
MICROORGANISM SPEC CULT: NORMAL
SERVICE CMNT-IMP: NORMAL
SPECIMEN DESCRIPTION: NORMAL

## 2025-03-05 ENCOUNTER — TRANSCRIBE ORDERS (OUTPATIENT)
Dept: GENERAL RADIOLOGY | Age: 70
End: 2025-03-05

## 2025-03-05 DIAGNOSIS — E04.1 THYROID NODULE: Primary | ICD-10-CM

## 2025-03-10 ENCOUNTER — HOSPITAL ENCOUNTER (OUTPATIENT)
Age: 70
Setting detail: OUTPATIENT SURGERY
Discharge: HOME | End: 2025-03-10
Attending: UROLOGY | Admitting: UROLOGY
Payer: MEDICARE

## 2025-03-10 ENCOUNTER — ANESTHESIA (OUTPATIENT)
Dept: OPERATING ROOM | Age: 70
End: 2025-03-10
Payer: MEDICARE

## 2025-03-10 ENCOUNTER — ANESTHESIA EVENT (OUTPATIENT)
Dept: OPERATING ROOM | Age: 70
End: 2025-03-10
Payer: MEDICARE

## 2025-03-10 VITALS
TEMPERATURE: 98.4 F | SYSTOLIC BLOOD PRESSURE: 107 MMHG | RESPIRATION RATE: 14 BRPM | HEART RATE: 68 BPM | BODY MASS INDEX: 19.48 KG/M2 | DIASTOLIC BLOOD PRESSURE: 64 MMHG | WEIGHT: 147 LBS | HEIGHT: 73 IN | OXYGEN SATURATION: 98 %

## 2025-03-10 PROCEDURE — 7100000010 HC PHASE II RECOVERY - FIRST 15 MIN: Performed by: UROLOGY

## 2025-03-10 PROCEDURE — 2709999900 HC NON-CHARGEABLE SUPPLY: Performed by: UROLOGY

## 2025-03-10 PROCEDURE — 2720000010 HC SURG SUPPLY STERILE: Performed by: UROLOGY

## 2025-03-10 PROCEDURE — 3700000000 HC ANESTHESIA ATTENDED CARE: Performed by: UROLOGY

## 2025-03-10 PROCEDURE — 6360000002 HC RX W HCPCS: Performed by: UROLOGY

## 2025-03-10 PROCEDURE — 6360000002 HC RX W HCPCS

## 2025-03-10 PROCEDURE — 3600000012 HC SURGERY LEVEL 2 ADDTL 15MIN: Performed by: UROLOGY

## 2025-03-10 PROCEDURE — 2500000003 HC RX 250 WO HCPCS

## 2025-03-10 PROCEDURE — 7100000011 HC PHASE II RECOVERY - ADDTL 15 MIN: Performed by: UROLOGY

## 2025-03-10 PROCEDURE — 3700000001 HC ADD 15 MINUTES (ANESTHESIA): Performed by: UROLOGY

## 2025-03-10 PROCEDURE — 2580000003 HC RX 258: Performed by: UROLOGY

## 2025-03-10 PROCEDURE — 3600000002 HC SURGERY LEVEL 2 BASE: Performed by: UROLOGY

## 2025-03-10 RX ORDER — SODIUM CHLORIDE 9 MG/ML
INJECTION, SOLUTION INTRAVENOUS PRN
Status: DISCONTINUED | OUTPATIENT
Start: 2025-03-10 | End: 2025-03-10 | Stop reason: HOSPADM

## 2025-03-10 RX ORDER — FENTANYL CITRATE 50 UG/ML
INJECTION, SOLUTION INTRAMUSCULAR; INTRAVENOUS
Status: DISCONTINUED | OUTPATIENT
Start: 2025-03-10 | End: 2025-03-10 | Stop reason: SDUPTHER

## 2025-03-10 RX ORDER — PROPOFOL 10 MG/ML
INJECTION, EMULSION INTRAVENOUS
Status: DISCONTINUED | OUTPATIENT
Start: 2025-03-10 | End: 2025-03-10 | Stop reason: SDUPTHER

## 2025-03-10 RX ORDER — SODIUM CHLORIDE, SODIUM LACTATE, POTASSIUM CHLORIDE, CALCIUM CHLORIDE 600; 310; 30; 20 MG/100ML; MG/100ML; MG/100ML; MG/100ML
INJECTION, SOLUTION INTRAVENOUS CONTINUOUS
Status: DISCONTINUED | OUTPATIENT
Start: 2025-03-10 | End: 2025-03-10 | Stop reason: HOSPADM

## 2025-03-10 RX ORDER — ROSUVASTATIN CALCIUM 20 MG/1
20 TABLET, COATED ORAL DAILY
COMMUNITY

## 2025-03-10 RX ORDER — SODIUM CHLORIDE 0.9 % (FLUSH) 0.9 %
5-40 SYRINGE (ML) INJECTION PRN
Status: DISCONTINUED | OUTPATIENT
Start: 2025-03-10 | End: 2025-03-10 | Stop reason: HOSPADM

## 2025-03-10 RX ORDER — SODIUM CHLORIDE 0.9 % (FLUSH) 0.9 %
5-40 SYRINGE (ML) INJECTION EVERY 12 HOURS SCHEDULED
Status: DISCONTINUED | OUTPATIENT
Start: 2025-03-10 | End: 2025-03-10 | Stop reason: HOSPADM

## 2025-03-10 RX ORDER — DEXMEDETOMIDINE HYDROCHLORIDE 100 UG/ML
INJECTION, SOLUTION INTRAVENOUS
Status: DISCONTINUED | OUTPATIENT
Start: 2025-03-10 | End: 2025-03-10 | Stop reason: SDUPTHER

## 2025-03-10 RX ORDER — LIDOCAINE HYDROCHLORIDE 20 MG/ML
INJECTION, SOLUTION EPIDURAL; INFILTRATION; INTRACAUDAL; PERINEURAL
Status: DISCONTINUED | OUTPATIENT
Start: 2025-03-10 | End: 2025-03-10 | Stop reason: SDUPTHER

## 2025-03-10 RX ADMIN — Medication 2000 MG: at 10:52

## 2025-03-10 RX ADMIN — PROPOFOL 120 MCG/KG/MIN: 10 INJECTION, EMULSION INTRAVENOUS at 10:52

## 2025-03-10 RX ADMIN — PHENYLEPHRINE HYDROCHLORIDE 100 MCG: 10 INJECTION INTRAVENOUS at 10:56

## 2025-03-10 RX ADMIN — PHENYLEPHRINE HYDROCHLORIDE 100 MCG: 10 INJECTION INTRAVENOUS at 10:52

## 2025-03-10 RX ADMIN — DEXMEDETOMIDINE HYDROCHLORIDE 10 MCG: 100 INJECTION, SOLUTION INTRAVENOUS at 10:51

## 2025-03-10 RX ADMIN — FENTANYL CITRATE 25 MCG: 50 INJECTION, SOLUTION INTRAMUSCULAR; INTRAVENOUS at 10:51

## 2025-03-10 RX ADMIN — LIDOCAINE HYDROCHLORIDE 80 MG: 20 INJECTION, SOLUTION EPIDURAL; INFILTRATION; INTRACAUDAL; PERINEURAL at 10:51

## 2025-03-10 RX ADMIN — SODIUM CHLORIDE, SODIUM LACTATE, POTASSIUM CHLORIDE, AND CALCIUM CHLORIDE: .6; .31; .03; .02 INJECTION, SOLUTION INTRAVENOUS at 09:48

## 2025-03-10 RX ADMIN — PROPOFOL 100 MG: 10 INJECTION, EMULSION INTRAVENOUS at 10:51

## 2025-03-10 ASSESSMENT — PAIN SCALES - GENERAL
PAINLEVEL_OUTOF10: 0

## 2025-03-10 ASSESSMENT — PAIN - FUNCTIONAL ASSESSMENT: PAIN_FUNCTIONAL_ASSESSMENT: 0-10

## 2025-03-10 ASSESSMENT — PAIN DESCRIPTION - DESCRIPTORS: DESCRIPTORS: ACHING

## 2025-03-10 NOTE — BRIEF OP NOTE
Brief Postoperative Note      Patient: Manish Roblero  YOB: 1955  MRN: [id number]    Date of Procedure: 3/10/2025    Pre-Op Diagnosis Codes:      * Enlarged prostate with urinary obstruction [N40.1, N13.8]    Post-Op Diagnosis: Same       Procedure(s):  Cystoscopy    Surgeon(s):  Robinson Cochran MD    Assistant:  * No surgical staff found *    Anesthesia: Monitor Anesthesia Care    Estimated Blood Loss (mL): Minimal    Complications: None    Specimens:   * No specimens in log *    Implants:  * No implants in log *      Drains: * No LDAs found *    Findings:  Retention with mod bph (30)  Cysto greenlight (30)-- will call to schedule vs chronic catheter?      Electronically signed by ROBINSON COCHRAN MD on 3/10/2025 at 11:25 AM

## 2025-03-10 NOTE — ANESTHESIA POSTPROCEDURE EVALUATION
Department of Anesthesiology  Postprocedure Note    Patient: Manish Roblero  MRN: [id number]  YOB: 1955  Date of evaluation: 3/10/2025    Procedure Summary       Date: 03/10/25 Room / Location: [address]    Anesthesia Start: 1046 Anesthesia Stop: 1110    Procedure: Cystoscopy (Bladder) Diagnosis:       Enlarged prostate with urinary obstruction      (Enlarged prostate with urinary obstruction [N40.1, N13.8])    Surgeons: Wan Cochran MD Responsible Provider: Seymour Hernandez APRN - CRNA    Anesthesia Type: General, TIVA ASA Status: 2            Anesthesia Type: General, TIVA    Baylee Phase I: Baylee Score: 10    Baylee Phase II: Baylee Score: 6    Anesthesia Post Evaluation    Patient location during evaluation: bedside  Patient participation: complete - patient participated  Level of consciousness: awake  Airway patency: patent  Nausea & Vomiting: no nausea  Cardiovascular status: hemodynamically stable  Respiratory status: room air  Hydration status: euvolemic  Pain management: satisfactory to patient    No notable events documented.

## 2025-03-10 NOTE — OP NOTE
Wan Cochran MD.  Urologic Surgery      [place], [place]    DATE: 3/10/2025  Patient:  Manish Roblero  MRN: [id number]  YOB: 1955    SURGEON: Wan Cochran MD.    ASSISTANT: none    PREOPERATIVE DIAGNOSIS:  bph    POSTOPERATIVE DIAGNOSIS:  bph retention    PROCEDURE PERFORMED: Cystoscopy    ANESTHESIA: Monitor Anesthesia Care    COMPLICATIONS: none    OR BLOOD LOSS:  Minimal    FLUIDS: Cystalloids per Anesthesia    SPECIMENS:  * No specimens in log *      DRAINS: none    INDICATIONS FOR PROCEDURE:  The patient is a 69 y.o. male who presents today with Enlarged prostate with urinary obstruction [N40.1, N13.8] here for Cystoscopy. After risks, benefits and alternatives of the procedure were discussed with the patient, the patient elected to proceed.     DETAILS OF PROCEDURE:  After informed consent was obtained in the preoperative area, the patient was taken back to the operating room and transferred to the operating table in supine position.  Anesthesia was induced and antibiotics were given.  The patient was placed in modified dorsal lithotomy position and sterilely prepped and draped in a standard fashion.  A timeout occurred.  Two patient identifiers were used.     We entered the urethra with a 22F scope with a 30 degree lens.       The prostate demonstrated bilobar hyperplasia with kissing glands and outlet obstruction.     In the bladder a full 360 degree cystoscopy was performed. There were no papillary lesions or other mucosal abnormalities noted. Moderate trabeculations were noted. diverticula were not noted. Ureteral orifices were located in normal configuration.       The bladder was drained. All instrumentation was removed. The patient was awakened and discharged back to the PACU in good and stable condition.       Discussed greenlight (30) vs chronic conley.

## 2025-03-10 NOTE — ANESTHESIA PRE PROCEDURE
Department of Anesthesiology  Preprocedure Note       Name:  Manish Roblero   Age:  69 y.o.  :  1955                                          MRN:  [id number]         Date:  3/10/2025      Surgeon: Surgeon(s):  Wan Cochran MD    Procedure: Procedure(s):  Cystoscopy    Medications prior to admission:   Prior to Admission medications    Medication Sig Start Date End Date Taking? Authorizing Provider   tamsulosin (FLOMAX) 0.4 MG capsule Take 1 capsule by mouth daily 25  Wan Cochran MD   mirtazapine (REMERON) 15 MG tablet Take 1/2 tablet po at bedtime for 2-3 days then 1 tablet at bedtime. 2/6/25 3/6/25  Montez Rodriguez MD   nystatin (MYCOSTATIN) 620183 UNIT/GM ointment Apply topically 2 times daily. 25   Neo Benites PA-C   Multiple Vitamin (MULTI-VITAMIN DAILY PO) Take 1 tablet by mouth daily    Montez Rodriguez MD   aspirin 81 MG chewable tablet Take 1 tablet by mouth every other day    Montez Rodriguez MD   vitamin D (CHOLECALCIFEROL) 125 MCG (5000 UT) CAPS capsule Take 1 capsule by mouth daily    ProviderMontez MD       Current medications:    Current Facility-Administered Medications   Medication Dose Route Frequency Provider Last Rate Last Admin    sodium chloride flush 0.9 % injection 5-40 mL  5-40 mL IntraVENous 2 times per day Wan Cochran MD        sodium chloride flush 0.9 % injection 5-40 mL  5-40 mL IntraVENous PRN Wan Cochran MD        0.9 % sodium chloride infusion   IntraVENous PRN Wan Cochran MD        lactated ringers infusion   IntraVENous Continuous Wan Cochran MD        ceFAZolin (ANCEF) 2000 mg in sterile water 20 mL IV syringe  2,000 mg IntraVENous On Call to OR Wan Cochran MD           Allergies:    Allergies   Allergen Reactions    Pcn [Penicillins]      Skin tested positive as child    Sulfa Antibiotics Nausea Only    Triamcinolone     Povidone Iodine Rash    Triamcinolone Hexacetonide Rash     Topical Kenalog only. Patient reports having

## 2025-03-10 NOTE — H&P
negative  Respiratory: negative  Cardiovascular: negative  Gastrointestinal: negative  Genitourinary: no acute issues  Musculoskeletal: negative  Skin: negative   Neurological: negative  Hematological/Lymphatic: negative  Psychological: negative    Physical Exam:      No data found.  Constitutional: Patient in no acute distress;   Neuro: alert and oriented to person place and time.    Psych: Mood and affect normal.  Skin: Normal  Lungs: Respiratory effort normal, CTA  Cardiovascular:  Normal peripheral pulses; no murmur. Normal rhythm  Abdomen: Soft, non-tender, non-distended with no CVA, flank pain, hepatosplenomegaly or hernia.  Kidneys normal.  Bladder non-tender and not distended.      LABS:   No results for input(s): \"WBC\", \"HGB\", \"HCT\", \"MCV\", \"PLT\" in the last 72 hours.  No results for input(s): \"NA\", \"K\", \"CL\", \"CO2\", \"PHOS\", \"BUN\", \"CREATININE\" in the last 72 hours.    Invalid input(s): \"CA\"  Lab Results   Component Value Date    PSA 2.20 09/16/2024    PSA 2.06 08/03/2023    PSA 1.11 01/19/2015         Urinalysis: No results for input(s): \"COLORU\", \"PHUR\", \"LABCAST\", \"WBCUA\", \"RBCUA\", \"MUCUS\", \"TRICHOMONAS\", \"YEAST\", \"BACTERIA\", \"CLARITYU\", \"SPECGRAV\", \"LEUKOCYTESUR\", \"UROBILINOGEN\", \"BILIRUBINUR\", \"BLOODU\" in the last 72 hours.    Invalid input(s): \"NITRATE\", \"GLUCOSEUKETONESUAMORPHOUS\"     -----------------------------------------------------------------      Assessment and Plan     Impression:    Patient Active Problem List   Diagnosis    Plantar fascial fibromatosis R/L    Acquired deformity of toe    Depression    GERD with esophagitis    Primary osteoarthritis of wrist    Disorder of bursae and tendons in shoulder region    Mixed hyperlipidemia    Hoarseness    Chronic prostatitis    Thrombosed hemorrhoids    Hyponatremia    Interstitial cystitis    Hyperbilirubinemia    Enlarged prostate with urinary obstruction       Plan:     Consent obtained; cysto in OR today    ROBINSON RENTERIA MD  8:30 AM

## 2025-03-10 NOTE — DISCHARGE INSTRUCTIONS
Pt ok to discharge home in good condition  No heavy lifting, >10 lbs for today  Pt should avoid strenuous activity for today  Pt should walk moderately at home  Pt ok to shower   Pt may resume diet as tolerated  Pt should take Rx as directed  No driving while on narcotics  Please call attending physician or hospital  with questions  Call or Present to ED if fever (> 101F), intractable nausea vomiting or pain.  Rx in chart

## 2025-03-12 ENCOUNTER — HOSPITAL ENCOUNTER (OUTPATIENT)
Dept: ULTRASOUND IMAGING | Age: 70
Discharge: HOME | End: 2025-03-14
Attending: FAMILY MEDICINE
Payer: MEDICARE

## 2025-03-12 DIAGNOSIS — E04.1 THYROID NODULE: ICD-10-CM

## 2025-03-12 PROCEDURE — 76536 US EXAM OF HEAD AND NECK: CPT

## 2025-05-07 ENCOUNTER — HOSPITAL ENCOUNTER (OUTPATIENT)
Dept: GENERAL RADIOLOGY | Age: 70
Discharge: HOME | End: 2025-05-09
Payer: MEDICARE

## 2025-05-07 DIAGNOSIS — M25.512 PAIN IN JOINT OF LEFT SHOULDER: ICD-10-CM

## 2025-05-07 PROCEDURE — 73030 X-RAY EXAM OF SHOULDER: CPT

## 2025-06-25 NOTE — TELEPHONE ENCOUNTER
Dr Schuler messaged requesting a script for prednisone to be sent to [facility]  Script sent to [facility] pharmacy  Patient left message to make aware   [Initial Evaluation] : an initial evaluation [FreeTextEntry1] : back pain

## 2025-08-20 ENCOUNTER — TRANSCRIBE ORDERS (OUTPATIENT)
Dept: GENERAL RADIOLOGY | Age: 70
End: 2025-08-20

## 2025-08-20 DIAGNOSIS — R60.0 BILATERAL EDEMA OF LOWER EXTREMITY: Primary | ICD-10-CM

## 2025-08-27 ENCOUNTER — HOSPITAL ENCOUNTER (OUTPATIENT)
Dept: INTERVENTIONAL RADIOLOGY/VASCULAR | Age: 70
Discharge: HOME | End: 2025-08-29
Payer: MEDICARE

## 2025-08-27 DIAGNOSIS — R60.0 BILATERAL EDEMA OF LOWER EXTREMITY: ICD-10-CM

## 2025-08-27 PROCEDURE — 93970 EXTREMITY STUDY: CPT

## 2025-08-28 LAB
VAS LEFT CFV DIAM: 12 MM
VAS LEFT CFV RFX: 0 S
VAS LEFT FV DIAM: 10 MM
VAS LEFT FV RFX: 0 S
VAS LEFT GSV AT KNEE DIAM: 2 MM
VAS LEFT GSV AT KNEE RFX: 0 S
VAS LEFT GSV BK PROX DIAM: 2 MM
VAS LEFT GSV BK PROX RFX: 0 S
VAS LEFT GSV JUNC DIAM: 6 MM
VAS LEFT GSV JUNC RFX: 0 S
VAS LEFT GSV THIGH MID DIAM: 2 MM
VAS LEFT GSV THIGHT MID RFX: 0 S
VAS LEFT POP RFX: 0 S
VAS LEFT POPLITEAL VEIN DIAM: 8 MM
VAS LEFT SSV PROX DIAM: 3 MM
VAS LEFT SSV PROX RFX: 0 S
VAS RIGHT CFV DIAM: 17 MM
VAS RIGHT CFV RFX: 0 S
VAS RIGHT FV DIAM: 10 MM
VAS RIGHT FV RFX: 1.6 S
VAS RIGHT GSV AK RFX: 0 S
VAS RIGHT GSV AT KNEE DIAM: 2 MM
VAS RIGHT GSV BK PROX DIAM: 3 MM
VAS RIGHT GSV BK PROX RFX: 0 S
VAS RIGHT GSV JUNC DIAM: 4 MM
VAS RIGHT GSV JUNC RFX: 0 S
VAS RIGHT GSV THIGH MID DIAM: 2 MM
VAS RIGHT GSV THIGH MID RFX: 0 S
VAS RIGHT POP RFX: 2.3 S
VAS RIGHT POPLITEAL VEIN DIAM: 10 MM
VAS RIGHT SSV PROX DIAM: 3 MM
VAS RIGHT SSV PROX RFX: 0 S

## 2025-08-28 PROCEDURE — 93970 EXTREMITY STUDY: CPT | Performed by: STUDENT IN AN ORGANIZED HEALTH CARE EDUCATION/TRAINING PROGRAM

## (undated) DEVICE — MDHZ CYSTOSCOPY: Brand: MEDLINE INDUSTRIES, INC.

## (undated) DEVICE — SOLUTION IRRIG 3000ML 0.9% SOD CHL USP UROMATIC PLAS CONT

## (undated) DEVICE — GLOVE ORANGE PI 7 1/2   MSG9075

## (undated) DEVICE — SOLUTION IRRIG 1000ML STRL H2O USP PLAS POUR BTL

## (undated) DEVICE — CYSTOURETHROSCOPE FLX OD 16.5 FR WORKING CHANNEL 7.2 FR

## (undated) DEVICE — SOLUTION SCRB 4OZ 4% CHG CLN BASE FOR PT SKIN ANTISEPSIS

## (undated) DEVICE — 4-PORT MANIFOLD: Brand: NEPTUNE 2